# Patient Record
Sex: MALE | Race: WHITE | NOT HISPANIC OR LATINO | URBAN - METROPOLITAN AREA
[De-identification: names, ages, dates, MRNs, and addresses within clinical notes are randomized per-mention and may not be internally consistent; named-entity substitution may affect disease eponyms.]

---

## 2018-05-03 ENCOUNTER — LAB REQUISITION (OUTPATIENT)
Dept: LAB | Facility: HOSPITAL | Age: 83
End: 2018-05-03
Attending: INTERNAL MEDICINE
Payer: COMMERCIAL

## 2018-05-03 DIAGNOSIS — Z85.46 PERSONAL HISTORY OF MALIGNANT NEOPLASM OF PROSTATE: ICD-10-CM

## 2018-05-03 LAB — PSA SERPL-MCNC: 0.47 NG/ML

## 2018-05-03 PROCEDURE — 84153 ASSAY OF PSA TOTAL: CPT | Performed by: INTERNAL MEDICINE

## 2018-07-06 RX ORDER — ROSUVASTATIN CALCIUM 10 MG/1
10 TABLET, COATED ORAL DAILY
COMMUNITY
Start: 2018-01-11 | End: 2018-12-27 | Stop reason: SDUPTHER

## 2018-07-06 RX ORDER — VIT C/E/ZN/COPPR/LUTEIN/ZEAXAN 250MG-90MG
3000 CAPSULE ORAL DAILY
COMMUNITY
Start: 2015-04-03

## 2018-07-06 RX ORDER — ASPIRIN 81 MG/1
81 TABLET ORAL DAILY
COMMUNITY
Start: 2012-06-05

## 2018-07-06 RX ORDER — FISH OIL/DHA/EPA 1200-144MG
1200 CAPSULE ORAL DAILY
COMMUNITY
Start: 2012-01-05 | End: 2022-01-17 | Stop reason: ALTCHOICE

## 2018-07-09 ENCOUNTER — OFFICE VISIT (OUTPATIENT)
Dept: CARDIOLOGY | Facility: CLINIC | Age: 83
End: 2018-07-09
Attending: INTERNAL MEDICINE
Payer: COMMERCIAL

## 2018-07-09 VITALS — WEIGHT: 162 LBS | RESPIRATION RATE: 16 BRPM | HEART RATE: 49 BPM | HEIGHT: 66 IN | BODY MASS INDEX: 26.03 KG/M2

## 2018-07-09 DIAGNOSIS — I25.10 CORONARY ARTERY DISEASE INVOLVING NATIVE CORONARY ARTERY, ANGINA PRESENCE UNSPECIFIED, UNSPECIFIED WHETHER NATIVE OR TRANSPLANTED HEART: Primary | ICD-10-CM

## 2018-07-09 PROBLEM — Z82.49 FAMILY HISTORY OF EARLY CAD: Status: ACTIVE | Noted: 2018-07-09

## 2018-07-09 PROBLEM — E78.5 HYPERLIPIDEMIA: Status: ACTIVE | Noted: 2018-07-09

## 2018-07-09 PROCEDURE — 99213 OFFICE O/P EST LOW 20 MIN: CPT | Performed by: INTERNAL MEDICINE

## 2018-07-09 PROCEDURE — 93000 ELECTROCARDIOGRAM COMPLETE: CPT | Performed by: INTERNAL MEDICINE

## 2018-07-09 ASSESSMENT — ENCOUNTER SYMPTOMS: STIFFNESS: 1

## 2018-07-09 NOTE — LETTER
July 9, 2018     Jose Manuel Espinosa MD  [address]  [place], [address]  [facility] [address]    Patient: Jalen Warren   YOB: 1935   Date of Visit: 7/9/2018       Dear Dr. Espinosa:    Thank you for referring Jalen Warren to me for evaluation. Below are my notes for this consultation.    If you have questions, please do not hesitate to call me. I look forward to following your patient along with you.         Sincerely,        Javi Aquino III, MD        CC: No Recipients  Javi Aquino III, MD  7/9/2018  7:06 PM  Signed     Outpatient Cardiology Office Note          History:  Jalen Warren is a 83 y.o. male who presents for cardiac evaluation.  The patient was last here in January 2018 he continues to play squash regularly he is swimming regularly patient reports no oppressive chest discomfort he reports no syncope or near syncope and has not had to use sublingual nitroglycerin since his last visit.  Patient has been compliant with his medical regimen      Past Medical History:   Diagnosis Date   • Coronary artery disease    • Lipid disorder      No past surgical history on file.  No Known Allergies    Current Outpatient Prescriptions:   •  aspirin 81 mg enteric coated tablet, Take 81 mg by mouth daily., Disp: , Rfl:   •  cholecalciferol, vitamin D3, (VITAMIN D3) 1,000 unit capsule, Take 1,000 Units by mouth daily., Disp: , Rfl:   •  fish oil-dha-epa 1,200-144-216 mg capsule, Take 1,200 mg by mouth daily., Disp: , Rfl:   •  magnesium chloride (SLOW-MAG) 71.5 mg tablet,delayed release (DR/EC), Take 71.5 mg by mouth daily., Disp: , Rfl:   •  rosuvastatin (CRESTOR) 10 mg tablet, Take 10 mg by mouth daily., Disp: , Rfl:   No family history on file.  Social History     Social History   • Marital status:      Spouse name: N/A   • Number of children: N/A   • Years of education: N/A     Social History Main Topics   • Smoking status: Never Smoker   • Smokeless tobacco: Never Used   •  Alcohol use Yes      Comment: social   • Drug use: No   • Sexual activity: Not Asked     Other Topics Concern   • None     Social History Narrative   • None         Review of Systems   Musculoskeletal: Positive for arthritis and stiffness.   All other systems reviewed and are negative.       Vitals:    07/09/18 1054   Pulse: (!) 49   Resp: 16       Physical Exam   Constitutional: He is oriented to person, place, and time. He appears well-developed and well-nourished. No distress.   HENT:   Head: Normocephalic and atraumatic.   Mouth/Throat: No oropharyngeal exudate.   Eyes: Conjunctivae are normal. Pupils are equal, round, and reactive to light. No scleral icterus.   Neck: No JVD present. No tracheal deviation present. No thyromegaly present.   Cardiovascular: Normal rate, regular rhythm, normal heart sounds and intact distal pulses.  Exam reveals no gallop and no friction rub.    No murmur heard.  /78 tilt negative   Pulmonary/Chest: Breath sounds normal. No respiratory distress. He has no rales. He exhibits no tenderness.   Abdominal: He exhibits no distension. There is no tenderness. There is no rebound.   Musculoskeletal: He exhibits no edema, tenderness or deformity.   Lymphadenopathy:     He has no cervical adenopathy.   Neurological: He is alert and oriented to person, place, and time. No cranial nerve deficit.   Skin: Skin is warm and dry.   Psychiatric: He has a normal mood and affect.   Vitals reviewed.      Lab Results   Component Value Date    CHOL 166 12/29/2016    TRIG 100 12/29/2016    HDL 67 12/29/2016    ALT 29 12/29/2016    AST 26 12/29/2016     12/29/2016    K 4.3 12/29/2016    CREATININE 0.7 (L) 12/29/2016      Imaging  Not applicable    ECG   sinus rhythm, No acute ischemic changes.  Compared to the prior tracing there has been no change          Atherosclerosis of coronary artery  Patient is not having any symptoms of angina as noted he had PCI of the posterior lateral branch of the  right coronary artery with placement of his science KISHAN December 2011.    Last noninvasive ischemic assessment in July 2016 showed no provokable ischemia.  Following discussion with the patient we have elected to pursue a conservative course.  We will not proceed with repeat noninvasive testing unless he were to develop symptoms suggestive of angina    Hyperlipidemia  Patient is tolerating rosuvastatin and fish oil which we will continue           Javi Aquino III, MD  7/9/2018

## 2018-07-09 NOTE — ASSESSMENT & PLAN NOTE
Patient is not having any symptoms of angina as noted he had PCI of the posterior lateral branch of the right coronary artery with placement of his science KISHAN December 2011.    Last noninvasive ischemic assessment in July 2016 showed no provokable ischemia.  Following discussion with the patient we have elected to pursue a conservative course.  We will not proceed with repeat noninvasive testing unless he were to develop symptoms suggestive of angina

## 2018-07-09 NOTE — PROGRESS NOTES
Outpatient Cardiology Office Note          History:  Jalen Warren is a 83 y.o. male who presents for cardiac evaluation.  The patient was last here in January 2018 he continues to play squash regularly he is swimming regularly patient reports no oppressive chest discomfort he reports no syncope or near syncope and has not had to use sublingual nitroglycerin since his last visit.  Patient has been compliant with his medical regimen      Past Medical History:   Diagnosis Date   • Coronary artery disease    • Lipid disorder      No past surgical history on file.  No Known Allergies    Current Outpatient Prescriptions:   •  aspirin 81 mg enteric coated tablet, Take 81 mg by mouth daily., Disp: , Rfl:   •  cholecalciferol, vitamin D3, (VITAMIN D3) 1,000 unit capsule, Take 1,000 Units by mouth daily., Disp: , Rfl:   •  fish oil-dha-epa 1,200-144-216 mg capsule, Take 1,200 mg by mouth daily., Disp: , Rfl:   •  magnesium chloride (SLOW-MAG) 71.5 mg tablet,delayed release (DR/EC), Take 71.5 mg by mouth daily., Disp: , Rfl:   •  rosuvastatin (CRESTOR) 10 mg tablet, Take 10 mg by mouth daily., Disp: , Rfl:   No family history on file.  Social History     Social History   • Marital status:      Spouse name: N/A   • Number of children: N/A   • Years of education: N/A     Social History Main Topics   • Smoking status: Never Smoker   • Smokeless tobacco: Never Used   • Alcohol use Yes      Comment: social   • Drug use: No   • Sexual activity: Not Asked     Other Topics Concern   • None     Social History Narrative   • None         Review of Systems   Musculoskeletal: Positive for arthritis and stiffness.   All other systems reviewed and are negative.       Vitals:    07/09/18 1054   Pulse: (!) 49   Resp: 16       Physical Exam   Constitutional: He is oriented to person, place, and time. He appears well-developed and well-nourished. No distress.   HENT:   Head: Normocephalic and atraumatic.   Mouth/Throat: No  oropharyngeal exudate.   Eyes: Conjunctivae are normal. Pupils are equal, round, and reactive to light. No scleral icterus.   Neck: No JVD present. No tracheal deviation present. No thyromegaly present.   Cardiovascular: Normal rate, regular rhythm, normal heart sounds and intact distal pulses.  Exam reveals no gallop and no friction rub.    No murmur heard.  /78 tilt negative   Pulmonary/Chest: Breath sounds normal. No respiratory distress. He has no rales. He exhibits no tenderness.   Abdominal: He exhibits no distension. There is no tenderness. There is no rebound.   Musculoskeletal: He exhibits no edema, tenderness or deformity.   Lymphadenopathy:     He has no cervical adenopathy.   Neurological: He is alert and oriented to person, place, and time. No cranial nerve deficit.   Skin: Skin is warm and dry.   Psychiatric: He has a normal mood and affect.   Vitals reviewed.      Lab Results   Component Value Date    CHOL 166 12/29/2016    TRIG 100 12/29/2016    HDL 67 12/29/2016    ALT 29 12/29/2016    AST 26 12/29/2016     12/29/2016    K 4.3 12/29/2016    CREATININE 0.7 (L) 12/29/2016      Imaging  Not applicable    ECG   sinus rhythm, No acute ischemic changes.  Compared to the prior tracing there has been no change          Atherosclerosis of coronary artery  Patient is not having any symptoms of angina as noted he had PCI of the posterior lateral branch of the right coronary artery with placement of his science KISHAN December 2011.    Last noninvasive ischemic assessment in July 2016 showed no provokable ischemia.  Following discussion with the patient we have elected to pursue a conservative course.  We will not proceed with repeat noninvasive testing unless he were to develop symptoms suggestive of angina    Hyperlipidemia  Patient is tolerating rosuvastatin and fish oil which we will continue           Javi Aquino III, MD  7/9/2018

## 2018-12-27 RX ORDER — ROSUVASTATIN CALCIUM 10 MG/1
10 TABLET, COATED ORAL DAILY
Qty: 90 TABLET | Refills: 3 | Status: SHIPPED | OUTPATIENT
Start: 2018-12-27 | End: 2020-01-22 | Stop reason: SDUPTHER

## 2018-12-31 ENCOUNTER — APPOINTMENT (OUTPATIENT)
Dept: LAB | Facility: HOSPITAL | Age: 83
End: 2018-12-31
Attending: INTERNAL MEDICINE
Payer: COMMERCIAL

## 2018-12-31 DIAGNOSIS — I25.10 CORONARY ARTERY DISEASE INVOLVING NATIVE CORONARY ARTERY, ANGINA PRESENCE UNSPECIFIED, UNSPECIFIED WHETHER NATIVE OR TRANSPLANTED HEART: ICD-10-CM

## 2018-12-31 LAB
ANION GAP SERPL CALC-SCNC: 7 MEQ/L (ref 3–15)
BUN SERPL-MCNC: 10 MG/DL (ref 8–20)
CALCIUM SERPL-MCNC: 9.1 MG/DL (ref 8.9–10.3)
CHLORIDE SERPL-SCNC: 103 MEQ/L (ref 98–109)
CO2 SERPL-SCNC: 28 MEQ/L (ref 22–32)
CREAT SERPL-MCNC: 0.7 MG/DL
GFR SERPL CREATININE-BSD FRML MDRD: >60 ML/MIN/1.73M*2
GLUCOSE SERPL-MCNC: 90 MG/DL (ref 70–99)
POTASSIUM SERPL-SCNC: 4.2 MEQ/L (ref 3.6–5.1)
SODIUM SERPL-SCNC: 138 MEQ/L (ref 136–144)

## 2018-12-31 PROCEDURE — 80061 LIPID PANEL: CPT | Performed by: INTERNAL MEDICINE

## 2018-12-31 PROCEDURE — 80048 BASIC METABOLIC PNL TOTAL CA: CPT

## 2018-12-31 PROCEDURE — 36415 COLL VENOUS BLD VENIPUNCTURE: CPT

## 2019-01-03 ENCOUNTER — OFFICE VISIT (OUTPATIENT)
Dept: CARDIOLOGY | Facility: CLINIC | Age: 84
End: 2019-01-03
Payer: COMMERCIAL

## 2019-01-03 VITALS — BODY MASS INDEX: 25.84 KG/M2 | HEART RATE: 60 BPM | RESPIRATION RATE: 16 BRPM | HEIGHT: 66 IN | WEIGHT: 160.8 LBS

## 2019-01-03 DIAGNOSIS — I25.10 ATHEROSCLEROSIS OF CORONARY ARTERY OF NATIVE HEART WITHOUT ANGINA PECTORIS, UNSPECIFIED VESSEL OR LESION TYPE: ICD-10-CM

## 2019-01-03 DIAGNOSIS — I25.10 CAD IN NATIVE ARTERY: Primary | ICD-10-CM

## 2019-01-03 DIAGNOSIS — E78.5 DYSLIPIDEMIA: ICD-10-CM

## 2019-01-03 DIAGNOSIS — E78.5 HYPERLIPIDEMIA, UNSPECIFIED HYPERLIPIDEMIA TYPE: ICD-10-CM

## 2019-01-03 PROCEDURE — 93000 ELECTROCARDIOGRAM COMPLETE: CPT | Performed by: INTERNAL MEDICINE

## 2019-01-03 PROCEDURE — 99214 OFFICE O/P EST MOD 30 MIN: CPT | Performed by: INTERNAL MEDICINE

## 2019-01-03 NOTE — ASSESSMENT & PLAN NOTE
Profile numbers are satisfactory.  He remains free of myalgias.  He will continue his current statin therapy.

## 2019-01-03 NOTE — ASSESSMENT & PLAN NOTE
Patient has a history of PCI of the posterior lateral branch of the right coronary artery with placement of a drug-eluting stent in December 2011.  He remains free of any anginal symptoms.  Last noninvasive ischemic assessment in July 2016 showed no provokable ischemia.  As per the patient's wishes we will can treat him conservatively.  We will not proceed with any noninvasive testing unless he were to develop symptoms suggestive of angina.

## 2019-01-03 NOTE — PROGRESS NOTES
Outpatient cardiology  Office visit     Reason for visit : Follow up  HPI   Jalen Warren is a 83 y.o. male who presents to the office for cardiovascular follow up.  Continues to place squash on a regular basis without difficulty.  There have been no changes to his medication regimen.  He denies any cardiac symptoms of chest pain at rest with exertion, shortness of breath, orthopnea, PND, lower extremity edema, palpitations, dizziness, presyncopal or syncopal symptoms.           Problem List:  · Hyperlipidemia  · Family history of early CAD  · Atherosclerosis of coronary artery.  In 2011 patient had PCI of the posterior lateral branch of the right coronary artery with placement of a drug-eluting stent.         Medication Sig   • aspirin 81 mg enteric coated tablet Take 81 mg by mouth daily.   • cholecalciferol, vitamin D3, (VITAMIN D3) 1,000 unit capsule Take 1,000 Units by mouth daily.   • fish oil-dha-epa 1,200-144-216 mg capsule Take 1,200 mg by mouth daily.   • magnesium chloride (SLOW-MAG) 71.5 mg tablet,delayed release (DR/EC) Take 71.5 mg by mouth daily.   • rosuvastatin (CRESTOR) 10 mg tablet Take 1 tablet (10 mg total) by mouth daily.       Allergies:  Patient has no known allergies.       Review of Systems  Constitution: Negative for chills, fever, weight gain and weight loss.   HENT: Negative for congestion, hearing loss and nosebleeds.    Eyes: Negative for visual disturbance.   Cardiovascular: Negative for chest pain, claudication, dyspnea on exertion, irregular heartbeat, leg swelling, near-syncope, orthopnea, palpitations, paroxysmal nocturnal dyspnea and syncope.   Respiratory: Negative for cough and shortness of breath.    Hematologic/Lymphatic: Negative for adenopathy. Does not bruise/bleed easily.   Skin: Negative for rash.   Musculoskeletal: Negative for falls, muscle weakness and myalgias.   Gastrointestinal: Negative for abdominal pain, anorexia, constipation, hematemesis, hematochezia,  "melena, nausea and vomiting.   Genitourinary: Negative for dysuria, frequency and nocturia.   Neurological: Negative for dizziness, focal weakness, headaches, light-headedness, numbness and paresthesias.        Objective   Vitals:    01/03/19 1023   Pulse: 60   Resp: 16   Weight: 72.9 kg (160 lb 12.8 oz)   Height: 1.676 m (5' 6\")   Blood pressure 132/68    Physical Exam  Constitutional: Well-developed and well-nourished. No distress.   HENT: Normocephalic and atraumatic. Moist mucous membranes.  Eyes: EOM are normal.   Neck: No JVD present. No carotid bruits.  Cardiovascular: Normal rate and regular rhythm. No murmur, rub or gallop.  Pulmonary/Chest: Normal effort and air entry. No wheezing, rales, ronchi.  Abdominal: Normal bowel sounds. Soft, non tender.   Musculoskeletal: No lower extremity edema or deformity.  Extremities: No edema.   Neurological: Alert and oriented to person, place, and time.   Skin: Skin is warm and dry. No rash.  Psychiatric: Normal mood, affect and behavior.        Labs:   Lab Results   Component Value Date    CHOL 147 12/31/2018    TRIG 61 12/31/2018    HDL 67 12/31/2018    LDLCALC 68 12/31/2018    NONHDLCALC 80 12/31/2018    ALT 29 12/29/2016    AST 26 12/29/2016     12/31/2018    K 4.2 12/31/2018     12/31/2018    CREATININE 0.7 (L) 12/31/2018    BUN 10 12/31/2018    CO2 28 12/31/2018       ECG :  Sinus rhythm             Assessment/Plan   Atherosclerosis of coronary artery  Patient has a history of PCI of the posterior lateral branch of the right coronary artery with placement of a drug-eluting stent in December 2011.  He remains free of any anginal symptoms.  Last noninvasive ischemic assessment in July 2016 showed no provokable ischemia.  As per the patient's wishes we will can treat him conservatively.  We will not proceed with any noninvasive testing unless he were to develop symptoms suggestive of angina.    Hyperlipidemia  Profile numbers are satisfactory.  He remains " free of myalgias.  He will continue his current statin therapy.      Follow up in 6 months.    I personally performed a history and physical and examined the patient with Maribell Vergara, nurse practitioner.  The patient is suffering from an upper respiratory infection today but has no evidence of pneumonitis or bronchitis.  This is most likely viral.  From a cardiac standpoint he is hemodynamically stable.  We will continue vigorous risk factor modification.  As noted previously we will hold any noninvasive testing unless the patient develops symptoms highly suggestive of an acute coronary syndrome as per his wish..

## 2019-01-03 NOTE — LETTER
January 3, 2019     Jose Manuel Espinosa MD  [address]  [place], [address]  [facility] [address]    Patient: Jalen Warren   YOB: 1935   Date of Visit: 1/3/2019       Dear Dr. Espinosa:    Thank you for referring Jalen Warren to me for evaluation. Below are my notes for this consultation.    If you have questions, please do not hesitate to call me. I look forward to following your patient along with you.         Sincerely,        Javi Aquino III, MD        CC: No Recipients  Maribell Vergara CRNP  1/3/2019 11:15 AM  Signed       Outpatient cardiology  Office visit     Reason for visit : Follow up  HPI   Jalen Warren is a 83 y.o. male who presents to the office for cardiovascular follow up.  Continues to place squash on a regular basis without difficulty.  There have been no changes to his medication regimen.  He denies any cardiac symptoms of chest pain at rest with exertion, shortness of breath, orthopnea, PND, lower extremity edema, palpitations, dizziness, presyncopal or syncopal symptoms.           Problem List:  · Hyperlipidemia  · Family history of early CAD  · Atherosclerosis of coronary artery.  In 2011 patient had PCI of the posterior lateral branch of the right coronary artery with placement of a drug-eluting stent.         Medication Sig   • aspirin 81 mg enteric coated tablet Take 81 mg by mouth daily.   • cholecalciferol, vitamin D3, (VITAMIN D3) 1,000 unit capsule Take 1,000 Units by mouth daily.   • fish oil-dha-epa 1,200-144-216 mg capsule Take 1,200 mg by mouth daily.   • magnesium chloride (SLOW-MAG) 71.5 mg tablet,delayed release (DR/EC) Take 71.5 mg by mouth daily.   • rosuvastatin (CRESTOR) 10 mg tablet Take 1 tablet (10 mg total) by mouth daily.       Allergies:  Patient has no known allergies.       Review of Systems  Constitution: Negative for chills, fever, weight gain and weight loss.   HENT: Negative for congestion, hearing loss and nosebleeds.    Eyes:  "Negative for visual disturbance.   Cardiovascular: Negative for chest pain, claudication, dyspnea on exertion, irregular heartbeat, leg swelling, near-syncope, orthopnea, palpitations, paroxysmal nocturnal dyspnea and syncope.   Respiratory: Negative for cough and shortness of breath.    Hematologic/Lymphatic: Negative for adenopathy. Does not bruise/bleed easily.   Skin: Negative for rash.   Musculoskeletal: Negative for falls, muscle weakness and myalgias.   Gastrointestinal: Negative for abdominal pain, anorexia, constipation, hematemesis, hematochezia, melena, nausea and vomiting.   Genitourinary: Negative for dysuria, frequency and nocturia.   Neurological: Negative for dizziness, focal weakness, headaches, light-headedness, numbness and paresthesias.        Objective   Vitals:    01/03/19 1023   Pulse: 60   Resp: 16   Weight: 72.9 kg (160 lb 12.8 oz)   Height: 1.676 m (5' 6\")   Blood pressure 132/68    Physical Exam  Constitutional: Well-developed and well-nourished. No distress.   HENT: Normocephalic and atraumatic. Moist mucous membranes.  Eyes: EOM are normal.   Neck: No JVD present. No carotid bruits.  Cardiovascular: Normal rate and regular rhythm. No murmur, rub or gallop.  Pulmonary/Chest: Normal effort and air entry. No wheezing, rales, ronchi.  Abdominal: Normal bowel sounds. Soft, non tender.   Musculoskeletal: No lower extremity edema or deformity.  Extremities: No edema.   Neurological: Alert and oriented to person, place, and time.   Skin: Skin is warm and dry. No rash.  Psychiatric: Normal mood, affect and behavior.        Labs:   Lab Results   Component Value Date    CHOL 147 12/31/2018    TRIG 61 12/31/2018    HDL 67 12/31/2018    LDLCALC 68 12/31/2018    NONHDLCALC 80 12/31/2018    ALT 29 12/29/2016    AST 26 12/29/2016     12/31/2018    K 4.2 12/31/2018     12/31/2018    CREATININE 0.7 (L) 12/31/2018    BUN 10 12/31/2018    CO2 28 12/31/2018       ECG :  Sinus rhythm         "     Assessment/Plan   Atherosclerosis of coronary artery  Patient has a history of PCI of the posterior lateral branch of the right coronary artery with placement of a drug-eluting stent in December 2011.  He remains free of any anginal symptoms.  Last noninvasive ischemic assessment in July 2016 showed no provokable ischemia.  As per the patient's wishes we will can treat him conservatively.  We will not proceed with any noninvasive testing unless he were to develop symptoms suggestive of angina.    Hyperlipidemia  Profile numbers are satisfactory.  He remains free of myalgias.  He will continue his current statin therapy.      Follow up in 6 months.    I personally performed a history and physical and examined the patient with Maribell Vergara, nurse practitioner.  The patient is suffering from an upper respiratory infection today but has no evidence of pneumonitis or bronchitis.  This is most likely viral.  From a cardiac standpoint he is hemodynamically stable.  We will continue vigorous risk factor modification.  As noted previously we will hold any noninvasive testing unless the patient develops symptoms highly suggestive of an acute coronary syndrome as per his wish..

## 2019-07-05 ENCOUNTER — OFFICE VISIT (OUTPATIENT)
Dept: CARDIOLOGY | Facility: CLINIC | Age: 84
End: 2019-07-05
Payer: COMMERCIAL

## 2019-07-05 VITALS — BODY MASS INDEX: 26.36 KG/M2 | WEIGHT: 164 LBS | RESPIRATION RATE: 16 BRPM | HEART RATE: 63 BPM | HEIGHT: 66 IN

## 2019-07-05 DIAGNOSIS — Z82.49 FAMILY HISTORY OF EARLY CAD: ICD-10-CM

## 2019-07-05 DIAGNOSIS — R00.2 PALPITATIONS: Primary | ICD-10-CM

## 2019-07-05 DIAGNOSIS — E78.5 HYPERLIPIDEMIA, UNSPECIFIED HYPERLIPIDEMIA TYPE: ICD-10-CM

## 2019-07-05 DIAGNOSIS — I25.10 ATHEROSCLEROSIS OF CORONARY ARTERY OF NATIVE HEART WITHOUT ANGINA PECTORIS, UNSPECIFIED VESSEL OR LESION TYPE: ICD-10-CM

## 2019-07-05 PROBLEM — R42 LIGHTHEADED: Status: ACTIVE | Noted: 2019-07-05

## 2019-07-05 PROCEDURE — 93000 ELECTROCARDIOGRAM COMPLETE: CPT | Performed by: INTERNAL MEDICINE

## 2019-07-05 PROCEDURE — 99214 OFFICE O/P EST MOD 30 MIN: CPT | Performed by: INTERNAL MEDICINE

## 2019-07-05 RX ORDER — OMEGA-3-ACID ETHYL ESTERS 1 G/1
1000 CAPSULE, LIQUID FILLED ORAL DAILY
COMMUNITY
End: 2022-01-17 | Stop reason: ALTCHOICE

## 2019-07-05 RX ORDER — ROSUVASTATIN CALCIUM 5 MG/1
5 TABLET, COATED ORAL DAILY
COMMUNITY
End: 2020-01-13

## 2019-07-05 RX ORDER — CHOLECALCIFEROL (VITAMIN D3) 25 MCG
1000 TABLET ORAL DAILY
COMMUNITY
End: 2022-01-17 | Stop reason: ALTCHOICE

## 2019-07-05 ASSESSMENT — ENCOUNTER SYMPTOMS
LIGHT-HEADEDNESS: 1
STIFFNESS: 1
DECREASED APPETITE: 0
WEIGHT GAIN: 1

## 2019-07-05 NOTE — LETTER
July 5, 2019     Jose Manuel Espinosa MD  [address]  [place]  [facility] [address]    Patient: Jalen Warren  YOB: 1935  Date of Visit: 7/5/2019      Dear Dr. Espinosa:    Thank you for referring Jalen Warren to me for evaluation. Below are my notes for this consultation.    If you have questions, please do not hesitate to call me. I look forward to following your patient along with you.         Sincerely,        Javi Aquino III, MD        CC: No Recipients  Javi Aquino III, MD  7/5/2019  9:49 AM  Signed     Outpatient Cardiology Office Note          History:  Jalen Warren is a 83 y.o. male who presents for cardiac evaluation.  The patient was last here in January.  Since that time he reports no oppressive chest discomfort.  He has had no significant exertional dyspnea.  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  The patient has also had some lightheadedness abrupt change in posture.  He now says he has to hold onto something when he is putting his pants on.  He has had no falls.  He has had no syncope.        Past Medical History:   Diagnosis Date   • Coronary artery disease    • Lipid disorder      History reviewed. No pertinent surgical history.  No Known Allergies    Current Outpatient Prescriptions:   •  aspirin 81 mg enteric coated tablet, Take 81 mg by mouth daily., Disp: , Rfl:   •  cholecalciferol, vitamin D3, (VITAMIN D3) 1,000 unit capsule, Take 1,000 Units by mouth daily., Disp: , Rfl:   •  fish oil-dha-epa 1,200-144-216 mg capsule, Take 1,200 mg by mouth daily., Disp: , Rfl:   •  magnesium chloride (SLOW-MAG) 71.5 mg tablet,delayed release (DR/EC), Take 71.5 mg by mouth daily., Disp: , Rfl:   •  omega-3 acid ethyl esters (LOVAZA) 1 gram capsule, Take 1,000 mg by mouth daily., Disp: , Rfl:   •  rosuvastatin  (CRESTOR) 10 mg tablet, Take 1 tablet (10 mg total) by mouth daily., Disp: 90 tablet, Rfl: 3  •  cholecalciferol, vitamin D3, 1,000 unit (25 mcg) tablet, Take 1,000 Units by mouth daily., Disp: , Rfl:   •  rosuvastatin (CRESTOR) 5 mg tablet, Take 5 mg by mouth once daily., Disp: , Rfl:   History reviewed. No pertinent family history.  Social History     Social History   • Marital status:      Spouse name: N/A   • Number of children: N/A   • Years of education: N/A     Social History Main Topics   • Smoking status: Never Smoker   • Smokeless tobacco: Never Used   • Alcohol use Yes      Comment: social   • Drug use: No   • Sexual activity: Not Asked     Other Topics Concern   • None     Social History Narrative   • None         Review of Systems   Constitution: Positive for weight gain. Negative for decreased appetite.   Musculoskeletal: Positive for stiffness.   Neurological: Positive for light-headedness.   All other systems reviewed and are negative.       Vitals:    07/05/19 0922   Pulse: 63   Resp: 16       Weight: 74.4 kg (164 lb)     Physical Exam   Constitutional: He is oriented to person, place, and time.   Well-developed well-nourished male in no acute distress  Weight 164 pounds up 4 pounds  Blood pressure 138/78 supine sitting and standing with no change in pulse.  He is not orthostatic   HENT:   Mouth/Throat: No oropharyngeal exudate.   Neck: No thyromegaly present.   Cardiovascular: Normal rate, regular rhythm, normal heart sounds and intact distal pulses.  Exam reveals no gallop and no friction rub.    No murmur heard.  Pulmonary/Chest: Breath sounds normal.   Abdominal: Bowel sounds are normal. He exhibits no distension.   Musculoskeletal: He exhibits no edema, tenderness or deformity.   Neurological: He is alert and oriented to person, place, and time. No cranial nerve deficit.   Skin: Skin is warm and dry. No erythema.   Psychiatric: He has a normal mood and affect. His behavior is normal.    Vitals reviewed.      Lab Results   Component Value Date    CHOL 147 12/31/2018    TRIG 61 12/31/2018    HDL 67 12/31/2018    ALT 29 12/29/2016    AST 26 12/29/2016     12/31/2018    K 4.2 12/31/2018    CREATININE 0.7 (L) 12/31/2018      Imaging  Not applicable    ECG   sinus rhythm, No acute ischemic changes    ECHO      1.  Transient lightheadedness.  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  I have encouraged him to remain well-hydrated particularly when exercising.  I really do not think there is much else to do with this point    2.  Coronary artery disease.  Status post PCI of the posterior lateral branch of the RCA with placement of a drug-eluting stent in December 2011.  His last noninvasive ischemic assessment in July 2016 showed no provokable ischemia we will continue conservative therapy he is aware for the need of immediate attention should he develop any symptoms consistent with unstable angina    3.  Dyslipidemia.  Most recent LDL cholesterol performed in May was 58 mg/dL which is absolutely optimal.  He will continue the same statin regimen         Javi Aquino III, MD  7/5/2019

## 2020-01-04 LAB
CHOLEST SERPL-MCNC: 170 MG/DL (ref 100–199)
HDLC SERPL-MCNC: 72 MG/DL
LDLC SERPL CALC-MCNC: 80 MG/DL (ref 0–99)
TRIGL SERPL-MCNC: 92 MG/DL (ref 0–149)
VLDLC SERPL CALC-MCNC: 18 MG/DL (ref 5–40)

## 2020-01-09 ENCOUNTER — TELEPHONE (OUTPATIENT)
Dept: SCHEDULING | Facility: CLINIC | Age: 85
End: 2020-01-09

## 2020-01-09 NOTE — TELEPHONE ENCOUNTER
FYI- patient called to cancel appt for today 1/9 due to illness, r/s for 1/13    Pt can be reached at [phone]

## 2020-01-13 ENCOUNTER — OFFICE VISIT (OUTPATIENT)
Dept: CARDIOLOGY | Facility: CLINIC | Age: 85
End: 2020-01-13
Payer: COMMERCIAL

## 2020-01-13 VITALS — HEART RATE: 66 BPM | RESPIRATION RATE: 16 BRPM | BODY MASS INDEX: 25.55 KG/M2 | WEIGHT: 159 LBS | HEIGHT: 66 IN

## 2020-01-13 DIAGNOSIS — I25.10 CORONARY ARTERY DISEASE, ANGINA PRESENCE UNSPECIFIED, UNSPECIFIED VESSEL OR LESION TYPE, UNSPECIFIED WHETHER NATIVE OR TRANSPLANTED HEART: Primary | ICD-10-CM

## 2020-01-13 DIAGNOSIS — E78.5 HYPERLIPIDEMIA, UNSPECIFIED HYPERLIPIDEMIA TYPE: ICD-10-CM

## 2020-01-13 DIAGNOSIS — I25.10 ATHEROSCLEROSIS OF CORONARY ARTERY OF NATIVE HEART WITHOUT ANGINA PECTORIS, UNSPECIFIED VESSEL OR LESION TYPE: ICD-10-CM

## 2020-01-13 PROCEDURE — 93000 ELECTROCARDIOGRAM COMPLETE: CPT | Performed by: INTERNAL MEDICINE

## 2020-01-13 PROCEDURE — 99214 OFFICE O/P EST MOD 30 MIN: CPT | Performed by: INTERNAL MEDICINE

## 2020-01-13 ASSESSMENT — ENCOUNTER SYMPTOMS
STIFFNESS: 1
WEIGHT LOSS: 0
WEIGHT GAIN: 0

## 2020-01-13 NOTE — PROGRESS NOTES
Javi Aquino III, MD, Merged with [facility], [facility]  Interventional Cardiology      [facility]  [facility]  [facility] [address] [facility]  [address] [place] [address]    TEL  [phone]  Fax:  [phone]  [facility] [place]            Outpatient Cardiology Office Note          History:  Jalen Warren is a 84 y.o. male who presents for cardiac evaluation.  Patient was last here in July.  He reports no oppressive chest discomfort or exertional dyspnea.  He is able to play squash 3 days a week.  He has had some continued lightheadedness with abrupt change in posture.  He has not fallen while playing squash.  He reports no dyspnea syncope or near syncope      Past Medical History:   Diagnosis Date   • Coronary artery disease    • Lipid disorder      History reviewed. No pertinent surgical history.  No Known Allergies    Current Outpatient Medications:   •  aspirin 81 mg enteric coated tablet, Take 81 mg by mouth daily., Disp: , Rfl:   •  cholecalciferol, vitamin D3, (VITAMIN D3) 1,000 unit capsule, Take 1,000 Units by mouth daily., Disp: , Rfl:   •  fish oil-dha-epa 1,200-144-216 mg capsule, Take 1,200 mg by mouth daily., Disp: , Rfl:   •  magnesium chloride (SLOW-MAG) 71.5 mg tablet,delayed release (DR/EC), Take 71.5 mg by mouth daily., Disp: , Rfl:   •  omega-3 acid ethyl esters (LOVAZA) 1 gram capsule, Take 1,000 mg by mouth daily., Disp: , Rfl:   •  rosuvastatin (CRESTOR) 10 mg tablet, Take 1 tablet (10 mg total) by mouth daily., Disp: 90 tablet, Rfl: 3  •  cholecalciferol, vitamin D3, 1,000 unit (25 mcg) tablet, Take 1,000 Units by mouth daily., Disp: , Rfl:   History reviewed. No pertinent family history.  Social History     Socioeconomic History   • Marital status:      Spouse name: None   • Number of children: None   • Years of education: None   • Highest education level: None   Occupational History   • None   Social Needs   • Financial resource strain:  None   • Food insecurity:     Worry: None     Inability: None   • Transportation needs:     Medical: None     Non-medical: None   Tobacco Use   • Smoking status: Never Smoker   • Smokeless tobacco: Never Used   Substance and Sexual Activity   • Alcohol use: Yes     Comment: social   • Drug use: No   • Sexual activity: None   Lifestyle   • Physical activity:     Days per week: None     Minutes per session: None   • Stress: None   Relationships   • Social connections:     Talks on phone: None     Gets together: None     Attends Worship service: None     Active member of club or organization: None     Attends meetings of clubs or organizations: None     Relationship status: None   • Intimate partner violence:     Fear of current or ex partner: None     Emotionally abused: None     Physically abused: None     Forced sexual activity: None   Other Topics Concern   • None   Social History Narrative   • None         Review of Systems   Constitution: Negative for weight gain and weight loss.   Musculoskeletal: Positive for arthritis and stiffness.   All other systems reviewed and are negative.       Vitals:    01/13/20 1036   Pulse: 66   Resp: 16       Weight: 72.1 kg (159 lb)     Physical Exam   Constitutional:   Well-developed well-nourished male in no acute distress  Weight 159 pounds  Blood pressure 130/68  Skin, HEENT: Normocephalic atraumatic  Neck: No bruits  Lungs clear  Cardiac regular rate.  I hear no murmurs  Abdomen soft  Extremities unremarkable no palpable cords.  Neuro: No focal deficits.       Vitals reviewed.      Lab Results   Component Value Date    CHOL 170 01/03/2020    TRIG 92 01/03/2020    HDL 72 01/03/2020    ALT 29 12/29/2016    AST 26 12/29/2016     12/31/2018    K 4.2 12/31/2018    CREATININE 0.7 (L) 12/31/2018      Imaging  Not applicable    ECG   sinus rhythm, occasional premature atrial beats, No acute ischemic change    ECHO      1.  Coronary artery disease.  Status post PCI of the  posterior lateral branch of the RCA with placement of a drug-eluting stent in December 2011.  Patient's last noninvasive ischemic assessment in July 2016 showed no provokable ischemia.  Patient's not really interested in proceeding with repeat noninvasive testing unless he were to develop symptoms.  The patient is aware of the need for immediate attention if there is any change in his clinical status.  He will continue the same regimen.    2.  Dyslipidemia.  Numbers of been satisfactory.  He will continue the same statin regimen.  He is having no myalgias or myositis.    3.  Patient reports that his son will be moving back to the [facility] from [place].  Of note, his 10-year-old granddaughter recently represented the [place] against [place] in squash         Javi Aquino III, MD  1/13/2020

## 2020-01-13 NOTE — LETTER
January 13, 2020     Jose Manuel Espinosa MD  [address] [place] [address] Ave  [place], [address] [place], [place] [id number]    Patient: Jalen Warren  YOB: 1935  Date of Visit: 1/13/2020      Dear Dr. Espinosa:    Thank you for referring Jalen Warren to me for evaluation. Below are my notes for this consultation.    If you have questions, please do not hesitate to call me. I look forward to following your patient along with you.         Sincerely,        Javi Aquino III, MD        CC: No Recipients  Javi Aquino III, MD  1/13/2020 11:17 AM  Signed        Javi Aquino III, MD, [facility], [facility]  Interventional Cardiology      [facility]  [facility]  [facility] Level  [address] [place] [address]    TEL  [phone]  Fax:  [phone]  [facility]            Outpatient Cardiology Office Note          History:  Jalen Warren is a 84 y.o. male who presents for cardiac evaluation.  Patient was last here in July.  He reports no oppressive chest discomfort or exertional dyspnea.  He is able to play squash 3 days a week.  He has had some continued lightheadedness with abrupt change in posture.  He has not fallen while playing squash.  He reports no dyspnea syncope or near syncope      Past Medical History:   Diagnosis Date   • Coronary artery disease    • Lipid disorder      History reviewed. No pertinent surgical history.  No Known Allergies    Current Outpatient Medications:   •  aspirin 81 mg enteric coated tablet, Take 81 mg by mouth daily., Disp: , Rfl:   •  cholecalciferol, vitamin D3, (VITAMIN D3) 1,000 unit capsule, Take 1,000 Units by mouth daily., Disp: , Rfl:   •  fish oil-dha-epa 1,200-144-216 mg capsule, Take 1,200 mg by mouth daily., Disp: , Rfl:   •  magnesium chloride (SLOW-MAG) 71.5 mg tablet,delayed release (DR/EC), Take 71.5 mg by mouth daily., Disp: , Rfl:   •  omega-3 acid ethyl esters (LOVAZA) 1 gram capsule, Take 1,000 mg  by mouth daily., Disp: , Rfl:   •  rosuvastatin (CRESTOR) 10 mg tablet, Take 1 tablet (10 mg total) by mouth daily., Disp: 90 tablet, Rfl: 3  •  cholecalciferol, vitamin D3, 1,000 unit (25 mcg) tablet, Take 1,000 Units by mouth daily., Disp: , Rfl:   History reviewed. No pertinent family history.  Social History     Socioeconomic History   • Marital status:      Spouse name: None   • Number of children: None   • Years of education: None   • Highest education level: None   Occupational History   • None   Social Needs   • Financial resource strain: None   • Food insecurity:     Worry: None     Inability: None   • Transportation needs:     Medical: None     Non-medical: None   Tobacco Use   • Smoking status: Never Smoker   • Smokeless tobacco: Never Used   Substance and Sexual Activity   • Alcohol use: Yes     Comment: social   • Drug use: No   • Sexual activity: None   Lifestyle   • Physical activity:     Days per week: None     Minutes per session: None   • Stress: None   Relationships   • Social connections:     Talks on phone: None     Gets together: None     Attends [employer] service: None     Active member of club or organization: None     Attends meetings of clubs or organizations: None     Relationship status: None   • Intimate partner violence:     Fear of current or ex partner: None     Emotionally abused: None     Physically abused: None     Forced sexual activity: None   Other Topics Concern   • None   Social History Narrative   • None         Review of Systems   Constitution: Negative for weight gain and weight loss.   Musculoskeletal: Positive for arthritis and stiffness.   All other systems reviewed and are negative.       Vitals:    01/13/20 1036   Pulse: 66   Resp: 16       Weight: 72.1 kg (159 lb)     Physical Exam   Constitutional:   Well-developed well-nourished male in no acute distress  Weight 159 pounds  Blood pressure 130/68  Skin, HEENT: Normocephalic atraumatic  Neck: No bruits  Lungs  clear  Cardiac regular rate.  I hear no murmurs  Abdomen soft  Extremities unremarkable no palpable cords.  Neuro: No focal deficits.       Vitals reviewed.      Lab Results   Component Value Date    CHOL 170 01/03/2020    TRIG 92 01/03/2020    HDL 72 01/03/2020    ALT 29 12/29/2016    AST 26 12/29/2016     12/31/2018    K 4.2 12/31/2018    CREATININE 0.7 (L) 12/31/2018      Imaging  Not applicable    ECG   sinus rhythm, occasional premature atrial beats, No acute ischemic change    ECHO      1.  Coronary artery disease.  Status post PCI of the posterior lateral branch of the RCA with placement of a drug-eluting stent in December 2011.  Patient's last noninvasive ischemic assessment in July 2016 showed no provokable ischemia.  Patient's not really interested in proceeding with repeat noninvasive testing unless he were to develop symptoms.  The patient is aware of the need for immediate attention if there is any change in his clinical status.  He will continue the same regimen.    2.  Dyslipidemia.  Numbers of been satisfactory.  He will continue the same statin regimen.  He is having no myalgias or myositis.    3.  Patient reports that his son will be moving back to the [facility] from [place].  Of note, his 10-year-old granddaughter recently represented the [place] against [place] in squash         Javi Aquino III, MD  1/13/2020

## 2020-01-22 RX ORDER — ROSUVASTATIN CALCIUM 10 MG/1
10 TABLET, COATED ORAL DAILY
Qty: 90 TABLET | Refills: 3 | Status: SHIPPED | OUTPATIENT
Start: 2020-01-22 | End: 2021-02-08

## 2020-07-16 ENCOUNTER — TELEPHONE (OUTPATIENT)
Dept: CARDIOLOGY | Facility: CLINIC | Age: 85
End: 2020-07-16

## 2020-07-16 NOTE — TELEPHONE ENCOUNTER
Message left for Jalen Warren to confirm 7.20.2020 in office visit. Asked Jalen Warren to please call office to confirm appointment, verify demographic information, review office restrictions and to complete travel screening. Please update appointment note upon return call.

## 2020-07-20 ENCOUNTER — OFFICE VISIT (OUTPATIENT)
Dept: CARDIOLOGY | Facility: CLINIC | Age: 85
End: 2020-07-20
Payer: COMMERCIAL

## 2020-07-20 VITALS
SYSTOLIC BLOOD PRESSURE: 130 MMHG | BODY MASS INDEX: 25.54 KG/M2 | RESPIRATION RATE: 17 BRPM | WEIGHT: 162.7 LBS | HEIGHT: 67 IN | HEART RATE: 61 BPM | DIASTOLIC BLOOD PRESSURE: 90 MMHG

## 2020-07-20 DIAGNOSIS — E78.5 HYPERLIPIDEMIA, UNSPECIFIED HYPERLIPIDEMIA TYPE: ICD-10-CM

## 2020-07-20 DIAGNOSIS — Z82.49 FAMILY HISTORY OF EARLY CAD: ICD-10-CM

## 2020-07-20 DIAGNOSIS — R42 LIGHTHEADED: ICD-10-CM

## 2020-07-20 DIAGNOSIS — I25.10 CORONARY ARTERY DISEASE, ANGINA PRESENCE UNSPECIFIED, UNSPECIFIED VESSEL OR LESION TYPE, UNSPECIFIED WHETHER NATIVE OR TRANSPLANTED HEART: Primary | ICD-10-CM

## 2020-07-20 DIAGNOSIS — I25.10 ATHEROSCLEROSIS OF CORONARY ARTERY OF NATIVE HEART WITHOUT ANGINA PECTORIS, UNSPECIFIED VESSEL OR LESION TYPE: ICD-10-CM

## 2020-07-20 PROCEDURE — 99214 OFFICE O/P EST MOD 30 MIN: CPT | Performed by: INTERNAL MEDICINE

## 2020-07-20 PROCEDURE — 93000 ELECTROCARDIOGRAM COMPLETE: CPT | Performed by: INTERNAL MEDICINE

## 2020-07-20 ASSESSMENT — ENCOUNTER SYMPTOMS
WEIGHT LOSS: 0
WEIGHT GAIN: 0
LIGHT-HEADEDNESS: 1

## 2020-07-20 NOTE — LETTER
July 20, 2020     Jose Manuel Espinosa MD  [address]  [place] [place], [place] [id number]    Patient: Jalen Warren  YOB: 1935  Date of Visit: 7/20/2020      Dear Dr. Espinosa:    Thank you for referring Jalen Warren to me for evaluation. Below are my notes for this consultation.    If you have questions, please do not hesitate to call me. I look forward to following your patient along with you.         Sincerely,        Javi Aquino III, MD        CC: No Recipients  Javi Aquino III, MD  7/20/2020 11:06 AM  Signed              Javi Aquino III, MD, [facility], [facility]  Interventional Cardiology      [facility]  [facility]  [facility] Level  [address] [place] [address]    TEL  [phone]  Fax:  [phone]  [facility]            Outpatient Cardiology Office Note          History:  Jalen Warren is a 85 y.o. male who presents for iliac evaluation.  The patient was last here in January.  He reports no oppressive chest discomfort.  He has had no syncope or near syncope.  He does have transient dizziness particularly after having an alcoholic beverage.  He has been playing tennis on grass in the mornings even when it has been particularly hot.  He was advised to avoid this.      Past Medical History:   Diagnosis Date   • Coronary artery disease    • Lipid disorder      History reviewed. No pertinent surgical history.  No Known Allergies    Current Outpatient Medications:   •  aspirin 81 mg enteric coated tablet, Take 81 mg by mouth daily., Disp: , Rfl:   •  cholecalciferol, vitamin D3, (VITAMIN D3) 1,000 unit capsule, Take 1,000 Units by mouth daily., Disp: , Rfl:   •  fish oil-dha-epa 1,200-144-216 mg capsule, Take 1,200 mg by mouth daily., Disp: , Rfl:   •  magnesium chloride (SLOW-MAG) 71.5 mg tablet,delayed release (DR/EC), Take 71.5 mg by mouth daily., Disp: , Rfl:   •  rosuvastatin (CRESTOR) 10 mg tablet,  Take 1 tablet (10 mg total) by mouth daily., Disp: 90 tablet, Rfl: 3  •  cholecalciferol, vitamin D3, 1,000 unit (25 mcg) tablet, Take 1,000 Units by mouth daily., Disp: , Rfl:   •  omega-3 acid ethyl esters (LOVAZA) 1 gram capsule, Take 1,000 mg by mouth daily., Disp: , Rfl:   Family History   Problem Relation Age of Onset   • Cancer Biological Mother    • No Known Problems Biological Father    • Heart disease Biological Brother      Social History     Socioeconomic History   • Marital status:      Spouse name: None   • Number of children: None   • Years of education: None   • Highest education level: None   Occupational History   • None   Social Needs   • Financial resource strain: None   • Food insecurity:     Worry: None     Inability: None   • Transportation needs:     Medical: None     Non-medical: None   Tobacco Use   • Smoking status: Never Smoker   • Smokeless tobacco: Never Used   Substance and Sexual Activity   • Alcohol use: Yes     Comment: social   • Drug use: No   • Sexual activity: None   Lifestyle   • Physical activity:     Days per week: None     Minutes per session: None   • Stress: None   Relationships   • Social connections:     Talks on phone: None     Gets together: None     Attends Orthodoxy service: None     Active member of club or organization: None     Attends meetings of clubs or organizations: None     Relationship status: None   • Intimate partner violence:     Fear of current or ex partner: None     Emotionally abused: None     Physically abused: None     Forced sexual activity: None   Other Topics Concern   • None   Social History Narrative   • None         Review of Systems   Constitution: Negative for weight gain and weight loss.   Neurological: Positive for light-headedness.   All other systems reviewed and are negative.       Vitals:    07/20/20 0943   BP: 130/90   Pulse: 61   Resp: 17       Weight: 73.8 kg (162 lb 11.2 oz)     Physical Exam   Constitutional:    Well-developed well-nourished male in no acute distress  Weight 162.7 pounds up almost 4 pounds  Blood pressure 140/78 supine sitting and standing with no change in pulse.  He does not have orthostatic hypotension  Skin: Warm dry  HEENT: Normocephalic atraumatic  Neck: No JVD no bruits no adenopathy no thyromegaly  Lungs clear  Cardiac regular rate tones are of good quality no S3 or S4  Abdomen soft bowel sounds present no organomegaly  Extremities distal pulses are full no clubbing no cyanosis no edema  Neuro no focal motor or sensory deficits no nystagmus.     Vitals reviewed.      Lab Results   Component Value Date    CHOL 170 01/03/2020    TRIG 92 01/03/2020    HDL 72 01/03/2020    ALT 29 12/29/2016    AST 26 12/29/2016     12/31/2018    K 4.2 12/31/2018    CREATININE 0.7 (L) 12/31/2018      Imaging  Not applicable    ECG   sinus rhythm, No acute ischemic changes    ECHO      1.  Coronary artery disease.  Prior PCI of the posterior lateral branch of the RCA with placement of a drug-eluting stent December 2011.  It is coming up on 9 years since his intervention last noninvasive assessment in July 2016 showed no provokable ischemia.  Patient's not interested in proceeding with further evaluation unless he were to develop recurrent symptoms.  Patient is aware of the need for immediate attention if there is any change in his clinical status.    2.  Dyslipidemia.  Patient's numbers have been satisfactory he will continue the same statin regimen.    3.  Transient lightheadedness.  This is certainly not orthostatic hypotension.  At this point I have encouraged the patient to stay well-hydrated.  He is not ever had symptoms while playing tennis.  We will continue to watch him.  He is aware of the need for prompt attention if the symptoms worsen         Javi Aquino III, MD  7/20/2020

## 2020-07-20 NOTE — PROGRESS NOTES
Javi Aquino III, MD, [facility], [facility]  Interventional Cardiology      [facility]  [facility]  [facility] [address]  [address] [place] [address]    TEL  [phone]  Fax:  [phone]  [facility]            Outpatient Cardiology Office Note          History:  Jalen Warren is a 85 y.o. male who presents for iliac evaluation.  The patient was last here in January.  He reports no oppressive chest discomfort.  He has had no syncope or near syncope.  He does have transient dizziness particularly after having an alcoholic beverage.  He has been playing tennis on grass in the mornings even when it has been particularly hot.  He was advised to avoid this.      Past Medical History:   Diagnosis Date   • Coronary artery disease    • Lipid disorder      History reviewed. No pertinent surgical history.  No Known Allergies    Current Outpatient Medications:   •  aspirin 81 mg enteric coated tablet, Take 81 mg by mouth daily., Disp: , Rfl:   •  cholecalciferol, vitamin D3, (VITAMIN D3) 1,000 unit capsule, Take 1,000 Units by mouth daily., Disp: , Rfl:   •  fish oil-dha-epa 1,200-144-216 mg capsule, Take 1,200 mg by mouth daily., Disp: , Rfl:   •  magnesium chloride (SLOW-MAG) 71.5 mg tablet,delayed release (DR/EC), Take 71.5 mg by mouth daily., Disp: , Rfl:   •  rosuvastatin (CRESTOR) 10 mg tablet, Take 1 tablet (10 mg total) by mouth daily., Disp: 90 tablet, Rfl: 3  •  cholecalciferol, vitamin D3, 1,000 unit (25 mcg) tablet, Take 1,000 Units by mouth daily., Disp: , Rfl:   •  omega-3 acid ethyl esters (LOVAZA) 1 gram capsule, Take 1,000 mg by mouth daily., Disp: , Rfl:   Family History   Problem Relation Age of Onset   • Cancer Biological Mother    • No Known Problems Biological Father    • Heart disease Biological Brother      Social History     Socioeconomic History   • Marital status:      Spouse name: None   • Number of children:  None   • Years of education: None   • Highest education level: None   Occupational History   • None   Social Needs   • Financial resource strain: None   • Food insecurity:     Worry: None     Inability: None   • Transportation needs:     Medical: None     Non-medical: None   Tobacco Use   • Smoking status: Never Smoker   • Smokeless tobacco: Never Used   Substance and Sexual Activity   • Alcohol use: Yes     Comment: social   • Drug use: No   • Sexual activity: None   Lifestyle   • Physical activity:     Days per week: None     Minutes per session: None   • Stress: None   Relationships   • Social connections:     Talks on phone: None     Gets together: None     Attends [employer] service: None     Active member of club or organization: None     Attends meetings of clubs or organizations: None     Relationship status: None   • Intimate partner violence:     Fear of current or ex partner: None     Emotionally abused: None     Physically abused: None     Forced sexual activity: None   Other Topics Concern   • None   Social History Narrative   • None         Review of Systems   Constitution: Negative for weight gain and weight loss.   Neurological: Positive for light-headedness.   All other systems reviewed and are negative.       Vitals:    07/20/20 0943   BP: 130/90   Pulse: 61   Resp: 17       Weight: 73.8 kg (162 lb 11.2 oz)     Physical Exam   Constitutional:   Well-developed well-nourished male in no acute distress  Weight 162.7 pounds up almost 4 pounds  Blood pressure 140/78 supine sitting and standing with no change in pulse.  He does not have orthostatic hypotension  Skin: Warm dry  HEENT: Normocephalic atraumatic  Neck: No JVD no bruits no adenopathy no thyromegaly  Lungs clear  Cardiac regular rate tones are of good quality no S3 or S4  Abdomen soft bowel sounds present no organomegaly  Extremities distal pulses are full no clubbing no cyanosis no edema  Neuro no focal motor or sensory deficits no  nystagmus.     Vitals reviewed.      Lab Results   Component Value Date    CHOL 170 01/03/2020    TRIG 92 01/03/2020    HDL 72 01/03/2020    ALT 29 12/29/2016    AST 26 12/29/2016     12/31/2018    K 4.2 12/31/2018    CREATININE 0.7 (L) 12/31/2018      Imaging  Not applicable    ECG   sinus rhythm, No acute ischemic changes    ECHO      1.  Coronary artery disease.  Prior PCI of the posterior lateral branch of the RCA with placement of a drug-eluting stent December 2011.  It is coming up on 9 years since his intervention last noninvasive assessment in July 2016 showed no provokable ischemia.  Patient's not interested in proceeding with further evaluation unless he were to develop recurrent symptoms.  Patient is aware of the need for immediate attention if there is any change in his clinical status.    2.  Dyslipidemia.  Patient's numbers have been satisfactory he will continue the same statin regimen.    3.  Transient lightheadedness.  This is certainly not orthostatic hypotension.  At this point I have encouraged the patient to stay well-hydrated.  He is not ever had symptoms while playing tennis.  We will continue to watch him.  He is aware of the need for prompt attention if the symptoms worsen         Javi Aquino III, MD  7/20/2020

## 2021-01-22 ENCOUNTER — TELEPHONE (OUTPATIENT)
Dept: CARDIOLOGY | Facility: CLINIC | Age: 86
End: 2021-01-22

## 2021-01-22 RX ORDER — EFINACONAZOLE 100 MG/ML
1 SOLUTION TOPICAL DAILY
COMMUNITY
Start: 2021-01-06 | End: 2022-07-26

## 2021-01-24 DIAGNOSIS — Z23 ENCOUNTER FOR IMMUNIZATION: ICD-10-CM

## 2021-01-25 ENCOUNTER — TELEPHONE (OUTPATIENT)
Dept: SCHEDULING | Facility: CLINIC | Age: 86
End: 2021-01-25

## 2021-01-25 ENCOUNTER — OFFICE VISIT (OUTPATIENT)
Dept: CARDIOLOGY | Facility: CLINIC | Age: 86
End: 2021-01-25
Payer: COMMERCIAL

## 2021-01-25 VITALS — BODY MASS INDEX: 26.06 KG/M2 | HEIGHT: 67 IN | OXYGEN SATURATION: 98 % | HEART RATE: 63 BPM | WEIGHT: 166 LBS

## 2021-01-25 DIAGNOSIS — I25.10 CORONARY ARTERY DISEASE, ANGINA PRESENCE UNSPECIFIED, UNSPECIFIED VESSEL OR LESION TYPE, UNSPECIFIED WHETHER NATIVE OR TRANSPLANTED HEART: Primary | ICD-10-CM

## 2021-01-25 PROCEDURE — 99214 OFFICE O/P EST MOD 30 MIN: CPT | Performed by: INTERNAL MEDICINE

## 2021-01-25 PROCEDURE — 93000 ELECTROCARDIOGRAM COMPLETE: CPT | Performed by: INTERNAL MEDICINE

## 2021-01-25 RX ORDER — LOSARTAN POTASSIUM 50 MG/1
50 TABLET ORAL DAILY
Qty: 90 TABLET | Refills: 3 | Status: SHIPPED | OUTPATIENT
Start: 2021-01-25 | End: 2022-02-21

## 2021-01-25 ASSESSMENT — ENCOUNTER SYMPTOMS
WEIGHT LOSS: 0
WEIGHT GAIN: 0
STIFFNESS: 1
DEPRESSION: 1
NERVOUS/ANXIOUS: 1

## 2021-01-25 NOTE — TELEPHONE ENCOUNTER
Return call from the pt to voice to his frustrations r/t his PCP is currently on My Chart however he is not part of [facility] so it does not integrate over and he feels that this is refraining him from receiving an email regarding eligibility    Contact # for My Chart Help Desk provided at this time     Stressed to continue to seek out [facility] web site for updates and contact# for [place] Help Line provided  [phone] [facility]

## 2021-01-25 NOTE — TELEPHONE ENCOUNTER
Pt calling stating he was informed by Dr. Aquino to visit [facility] to sign up for vaccine. However pt states he was unable to do so. Pt was advised to contact PCP and [facility]. However pt was addiment about receiving guidance from Dr. Aquino.    Pt can be reached at [phone]

## 2021-01-25 NOTE — TELEPHONE ENCOUNTER
Return call to Mr Warren    No answer to contact # at this time    Detailed voicemail left advising the pt that the [facility] is not providing the COVID vaccine at this time and should continue to seek advise from his PCP &/or the [facility] web site for local agencies providing it    I also continued to encourage use of MyChart if he so chooses to have the vaccine via [facility]    Call back # if needed is [phone]

## 2021-01-25 NOTE — PROGRESS NOTES
Javi Aquino III, MD, [facility], [facility]  Interventional Cardiology      [facility] HEART GROUP  [facility]  [facility]  [address] [place] [address]    TEL  [phone]  Fax:  [phone]  [facility]            Outpatient Cardiology Office Note          History:  Jalen Warren is a 85 y.o. male who presents for cardiac evaluation.  Patient was last here in July.  He reports no severe oppressive chest discomfort.  He has had no syncope or near syncope.  Patient readily admits to being under increased emotional stress.  He is quarantined with his wife.  Neither of had Covid symptoms.  The patient is very concerned about his wife that she has not seen a physician in over 3years.  He also believes that he has become increasingly agitated during the pandemic due to social isolation.  As a result, the patient reports that he has transient lightheadedness particularly when he gets into a verbal altercation with his spouse.  He has had no syncope or near syncope.  He remains compliant with his medical regimen.  Patient is getting out a few times a week to play squash.      Past Medical History:   Diagnosis Date   • Coronary artery disease    • Lipid disorder      History reviewed. No pertinent surgical history.  No Known Allergies    Current Outpatient Medications:   •  aspirin 81 mg enteric coated tablet, Take 81 mg by mouth daily., Disp: , Rfl:   •  cholecalciferol, vitamin D3, (VITAMIN D3) 1,000 unit capsule, Take 1,000 Units by mouth daily., Disp: , Rfl:   •  fish oil-dha-epa 1,200-144-216 mg capsule, Take 1,200 mg by mouth daily., Disp: , Rfl:   •  JUBLIA 10 % solution with applicator, Apply 1 application topically daily., Disp: , Rfl:   •  magnesium chloride (SLOW-MAG) 71.5 mg tablet,delayed release (DR/EC), Take 71.5 mg by mouth daily., Disp: , Rfl:   •  omega-3 acid ethyl esters (LOVAZA) 1 gram capsule, Take 1,000 mg by mouth daily., Disp:  , Rfl:   •  rosuvastatin (CRESTOR) 10 mg tablet, Take 1 tablet (10 mg total) by mouth daily., Disp: 90 tablet, Rfl: 3  •  cholecalciferol, vitamin D3, 1,000 unit (25 mcg) tablet, Take 1,000 Units by mouth daily., Disp: , Rfl:   •  losartan (COZAAR) 50 mg tablet, Take 1 tablet (50 mg total) by mouth daily., Disp: 90 tablet, Rfl: 3  Family History   Problem Relation Age of Onset   • Cancer Biological Mother    • No Known Problems Biological Father    • Heart disease Biological Brother      Social History     Socioeconomic History   • Marital status:      Spouse name: None   • Number of children: None   • Years of education: None   • Highest education level: None   Occupational History   • None   Social Needs   • Financial resource strain: None   • Food insecurity     Worry: None     Inability: None   • Transportation needs     Medical: None     Non-medical: None   Tobacco Use   • Smoking status: Never Smoker   • Smokeless tobacco: Never Used   Substance and Sexual Activity   • Alcohol use: Yes     Comment: social   • Drug use: No   • Sexual activity: Defer   Lifestyle   • Physical activity     Days per week: None     Minutes per session: None   • Stress: None   Relationships   • Social connections     Talks on phone: None     Gets together: None     Attends Hoahaoism service: None     Active member of club or organization: None     Attends meetings of clubs or organizations: None     Relationship status: None   • Intimate partner violence     Fear of current or ex partner: None     Emotionally abused: None     Physically abused: None     Forced sexual activity: None   Other Topics Concern   • None   Social History Narrative   • None         Review of Systems   Constitution: Negative for weight gain and weight loss.   Musculoskeletal: Positive for arthritis and stiffness.   Psychiatric/Behavioral: Positive for depression. The patient is nervous/anxious.    All other systems reviewed and are negative.        Vitals:    01/25/21 1047   Pulse: 63   SpO2: 98%       Weight: 75.3 kg (166 lb)     Physical Exam   Constitutional:   Well-developed well-nourished male in no acute distress.  Weight 166 pounds.  Blood pressure 160/80-84 in both upper extremities without orthostatic changes  Skin: Warm dry  HEENT: Normocephalic atraumatic.  Conjunctiva pink sclera nonicteric.  Neck: No JVD, no bruits no adenopathy or thyromegaly.  Lungs: Clear  Cardiac: Regular rate, tones are of good quality, no murmurs or rubs heard today.  Abdomen: Soft bowel sounds present no organomegaly or masses.  Extremities: Distal pulses are full, no clubbing no cyanosis no edema no palpable cords.  Neuro: No focal motor or sensory deficits mood and affect are appropriate.   Vitals reviewed.      Lab Results   Component Value Date    CHOL 170 01/03/2020    TRIG 92 01/03/2020    HDL 72 01/03/2020    ALT 29 12/29/2016    AST 26 12/29/2016     12/31/2018    K 4.2 12/31/2018    CREATININE 0.7 (L) 12/31/2018      Imaging  Not applicable    ECG   sinus rhythm, Cannot exclude an anterior scar of indeterminate age    ECHO      1.  Coronary artery disease.  Prior PCI of the posterolateral branch of the RCA with placement of a drug-eluting stent in December 2011.  The patient had a noninvasive ischemic assessment in July 2016 showing no provokable ischemia.  Patient has decided that he may view of his age and clinical stability he is not interested in pursuing repeat noninvasive evaluation unless he were to develop unstable symptoms.  Patient is aware of the need for prompt attention if there is any change in his clinical status.    2.  Elevated blood pressure.  I think this may be related to stress.  I have asked him to add losartan 50 mg nightly to his regimen.    3.  Dyslipidemia.  The patient will continue the same statin regimen.    4.  Acute situational stress.  I have encouraged the patient to make sure he is enrolled to receive the vaccine.  We  spent a good deal of time talking about strategies to try to reduce stress.         Javi Aquino III, MD  1/25/2021

## 2021-01-25 NOTE — LETTER
January 25, 2021     Jose Manuel Espinosa MD  [address]  [place], [place] [address] [place], [place] [address]    Patient: Jalen Warren  YOB: 1935  Date of Visit: 1/25/2021      Dear Dr. Espinosa:    Thank you for referring Jalen Warren to me for evaluation. Below are my notes for this consultation.    If you have questions, please do not hesitate to call me. I look forward to following your patient along with you.         Sincerely,        Javi Aquino III, MD        CC: No Recipients  Javi Aquino III, MD  1/25/2021 12:21 PM  Signed        Javi Aquino III, MD, [facility], [facility]  Interventional Cardiology      [facility]  [facility]  [facility]  [address] [id number]    TEL  [phone]  Fax:  [phone]  [facility]            Outpatient Cardiology Office Note          History:  Jalen Warren is a 85 y.o. male who presents for cardiac evaluation.  Patient was last here in July.  He reports no severe oppressive chest discomfort.  He has had no syncope or near syncope.  Patient readily admits to being under increased emotional stress.  He is quarantined with his wife.  Neither of had Covid symptoms.  The patient is very concerned about his wife that she has not seen a physician in over 3years.  He also believes that he has become increasingly agitated during the pandemic due to social isolation.  As a result, the patient reports that he has transient lightheadedness particularly when he gets into a verbal altercation with his spouse.  He has had no syncope or near syncope.  He remains compliant with his medical regimen.  Patient is getting out a few times a week to play squash.      Past Medical History:   Diagnosis Date   • Coronary artery disease    • Lipid disorder      History reviewed. No pertinent surgical history.  No Known Allergies    Current Outpatient Medications:   •  aspirin 81 mg enteric coated  tablet, Take 81 mg by mouth daily., Disp: , Rfl:   •  cholecalciferol, vitamin D3, (VITAMIN D3) 1,000 unit capsule, Take 1,000 Units by mouth daily., Disp: , Rfl:   •  fish oil-dha-epa 1,200-144-216 mg capsule, Take 1,200 mg by mouth daily., Disp: , Rfl:   •  JUBLIA 10 % solution with applicator, Apply 1 application topically daily., Disp: , Rfl:   •  magnesium chloride (SLOW-MAG) 71.5 mg tablet,delayed release (DR/EC), Take 71.5 mg by mouth daily., Disp: , Rfl:   •  omega-3 acid ethyl esters (LOVAZA) 1 gram capsule, Take 1,000 mg by mouth daily., Disp: , Rfl:   •  rosuvastatin (CRESTOR) 10 mg tablet, Take 1 tablet (10 mg total) by mouth daily., Disp: 90 tablet, Rfl: 3  •  cholecalciferol, vitamin D3, 1,000 unit (25 mcg) tablet, Take 1,000 Units by mouth daily., Disp: , Rfl:   •  losartan (COZAAR) 50 mg tablet, Take 1 tablet (50 mg total) by mouth daily., Disp: 90 tablet, Rfl: 3  Family History   Problem Relation Age of Onset   • Cancer Biological Mother    • No Known Problems Biological Father    • Heart disease Biological Brother      Social History     Socioeconomic History   • Marital status:      Spouse name: None   • Number of children: None   • Years of education: None   • Highest education level: None   Occupational History   • None   Social Needs   • Financial resource strain: None   • Food insecurity     Worry: None     Inability: None   • Transportation needs     Medical: None     Non-medical: None   Tobacco Use   • Smoking status: Never Smoker   • Smokeless tobacco: Never Used   Substance and Sexual Activity   • Alcohol use: Yes     Comment: social   • Drug use: No   • Sexual activity: Defer   Lifestyle   • Physical activity     Days per week: None     Minutes per session: None   • Stress: None   Relationships   • Social connections     Talks on phone: None     Gets together: None     Attends Yazidism service: None     Active member of club or organization: None     Attends meetings of clubs or  organizations: None     Relationship status: None   • Intimate partner violence     Fear of current or ex partner: None     Emotionally abused: None     Physically abused: None     Forced sexual activity: None   Other Topics Concern   • None   Social History Narrative   • None         Review of Systems   Constitution: Negative for weight gain and weight loss.   Musculoskeletal: Positive for arthritis and stiffness.   Psychiatric/Behavioral: Positive for depression. The patient is nervous/anxious.    All other systems reviewed and are negative.       Vitals:    01/25/21 1047   Pulse: 63   SpO2: 98%       Weight: 75.3 kg (166 lb)     Physical Exam   Constitutional:   Well-developed well-nourished male in no acute distress.  Weight 166 pounds.  Blood pressure 160/80-84 in both upper extremities without orthostatic changes  Skin: Warm dry  HEENT: Normocephalic atraumatic.  Conjunctiva pink sclera nonicteric.  Neck: No JVD, no bruits no adenopathy or thyromegaly.  Lungs: Clear  Cardiac: Regular rate, tones are of good quality, no murmurs or rubs heard today.  Abdomen: Soft bowel sounds present no organomegaly or masses.  Extremities: Distal pulses are full, no clubbing no cyanosis no edema no palpable cords.  Neuro: No focal motor or sensory deficits mood and affect are appropriate.   Vitals reviewed.      Lab Results   Component Value Date    CHOL 170 01/03/2020    TRIG 92 01/03/2020    HDL 72 01/03/2020    ALT 29 12/29/2016    AST 26 12/29/2016     12/31/2018    K 4.2 12/31/2018    CREATININE 0.7 (L) 12/31/2018      Imaging  Not applicable    ECG   sinus rhythm, Cannot exclude an anterior scar of indeterminate age    ECHO      1.  Coronary artery disease.  Prior PCI of the posterolateral branch of the RCA with placement of a drug-eluting stent in December 2011.  The patient had a noninvasive ischemic assessment in July 2016 showing no provokable ischemia.  Patient has decided that he may view of his age and  clinical stability he is not interested in pursuing repeat noninvasive evaluation unless he were to develop unstable symptoms.  Patient is aware of the need for prompt attention if there is any change in his clinical status.    2.  Elevated blood pressure.  I think this may be related to stress.  I have asked him to add losartan 50 mg nightly to his regimen.    3.  Dyslipidemia.  The patient will continue the same statin regimen.    4.  Acute situational stress.  I have encouraged the patient to make sure he is enrolled to receive the vaccine.  We spent a good deal of time talking about strategies to try to reduce stress.         Javi Aquino III, MD  1/25/2021

## 2021-01-27 ENCOUNTER — IMMUNIZATIONS (OUTPATIENT)
Dept: FAMILY MEDICINE CLINIC | Facility: HOSPITAL | Age: 86
End: 2021-01-27

## 2021-01-27 DIAGNOSIS — Z23 ENCOUNTER FOR IMMUNIZATION: Primary | ICD-10-CM

## 2021-01-27 PROCEDURE — 91301 SARS-COV-2 / COVID-19 MRNA VACCINE (MODERNA) 100 MCG: CPT

## 2021-01-27 PROCEDURE — 0011A SARS-COV-2 / COVID-19 MRNA VACCINE (MODERNA) 100 MCG: CPT

## 2021-02-22 ENCOUNTER — IMMUNIZATIONS (OUTPATIENT)
Dept: FAMILY MEDICINE CLINIC | Facility: HOSPITAL | Age: 86
End: 2021-02-22

## 2021-02-22 DIAGNOSIS — Z23 ENCOUNTER FOR IMMUNIZATION: Primary | ICD-10-CM

## 2021-02-22 PROCEDURE — 0012A SARS-COV-2 / COVID-19 MRNA VACCINE (MODERNA) 100 MCG: CPT

## 2021-02-22 PROCEDURE — 91301 SARS-COV-2 / COVID-19 MRNA VACCINE (MODERNA) 100 MCG: CPT

## 2021-02-25 ENCOUNTER — LAB REQUISITION (OUTPATIENT)
Dept: LAB | Facility: HOSPITAL | Age: 86
End: 2021-02-25
Attending: INTERNAL MEDICINE
Payer: COMMERCIAL

## 2021-02-25 DIAGNOSIS — E78.5 HYPERLIPIDEMIA, UNSPECIFIED: ICD-10-CM

## 2021-02-25 DIAGNOSIS — R79.82 ELEVATED C-REACTIVE PROTEIN (CRP): ICD-10-CM

## 2021-02-25 DIAGNOSIS — R53.83 OTHER FATIGUE: ICD-10-CM

## 2021-02-25 LAB
ALBUMIN SERPL-MCNC: 4.1 G/DL (ref 3.4–5)
ALP SERPL-CCNC: 35 IU/L (ref 35–126)
ALT SERPL-CCNC: 25 IU/L (ref 16–63)
ANION GAP SERPL CALC-SCNC: 11 MEQ/L (ref 3–15)
AST SERPL-CCNC: 23 IU/L (ref 15–41)
BASOPHILS # BLD: 0.04 K/UL (ref 0.01–0.1)
BASOPHILS NFR BLD: 0.9 %
BILIRUB SERPL-MCNC: 1.1 MG/DL (ref 0.3–1.2)
BUN SERPL-MCNC: 15 MG/DL (ref 8–20)
CALCIUM SERPL-MCNC: 9.3 MG/DL (ref 8.9–10.3)
CHLORIDE SERPL-SCNC: 101 MEQ/L (ref 98–109)
CHOLEST SERPL-MCNC: 143 MG/DL
CK SERPL-CCNC: 121 U/L (ref 16–300)
CO2 SERPL-SCNC: 26 MEQ/L (ref 22–32)
CREAT SERPL-MCNC: 0.6 MG/DL (ref 0.8–1.3)
CRP SERPL HS-MCNC: 3.19 MG/L
DIFFERENTIAL METHOD BLD: NORMAL
EOSINOPHIL # BLD: 0.12 K/UL (ref 0.04–0.54)
EOSINOPHIL NFR BLD: 2.6 %
ERYTHROCYTE [DISTWIDTH] IN BLOOD BY AUTOMATED COUNT: 12.9 % (ref 11.6–14.4)
GFR SERPL CREATININE-BSD FRML MDRD: >60 ML/MIN/1.73M*2
GLUCOSE SERPL-MCNC: 115 MG/DL (ref 70–99)
HCT VFR BLDCO AUTO: 44.1 % (ref 40.1–51)
HDLC SERPL-MCNC: 69 MG/DL
HDLC SERPL: 2.1 {RATIO}
HGB BLD-MCNC: 14.5 G/DL (ref 13.7–17.5)
IMM GRANULOCYTES # BLD AUTO: 0.02 K/UL (ref 0–0.08)
IMM GRANULOCYTES NFR BLD AUTO: 0.4 %
LDLC SERPL CALC-MCNC: 58 MG/DL
LYMPHOCYTES # BLD: 1.25 K/UL (ref 1.2–3.5)
LYMPHOCYTES NFR BLD: 26.9 %
MCH RBC QN AUTO: 30 PG (ref 28–33.2)
MCHC RBC AUTO-ENTMCNC: 32.9 G/DL (ref 32.2–36.5)
MCV RBC AUTO: 91.3 FL (ref 83–98)
MONOCYTES # BLD: 0.57 K/UL (ref 0.3–1)
MONOCYTES NFR BLD: 12.3 %
NEUTROPHILS # BLD: 2.64 K/UL (ref 1.7–7)
NEUTS SEG NFR BLD: 56.9 %
NONHDLC SERPL-MCNC: 74 MG/DL
NRBC BLD-RTO: 0 %
PDW BLD AUTO: 11.6 FL (ref 9.4–12.4)
PLATELET # BLD AUTO: 150 K/UL (ref 150–350)
POTASSIUM SERPL-SCNC: 4.2 MEQ/L (ref 3.6–5.1)
PROT SERPL-MCNC: 6.3 G/DL (ref 6–8.2)
RBC # BLD AUTO: 4.83 M/UL (ref 4.5–5.8)
SODIUM SERPL-SCNC: 138 MEQ/L (ref 136–144)
TRIGL SERPL-MCNC: 80 MG/DL (ref 30–149)
WBC # BLD AUTO: 4.64 K/UL (ref 3.8–10.5)

## 2021-02-25 PROCEDURE — 86141 C-REACTIVE PROTEIN HS: CPT | Performed by: INTERNAL MEDICINE

## 2021-02-25 PROCEDURE — 85025 COMPLETE CBC W/AUTO DIFF WBC: CPT | Performed by: INTERNAL MEDICINE

## 2021-02-25 PROCEDURE — 80053 COMPREHEN METABOLIC PANEL: CPT | Performed by: INTERNAL MEDICINE

## 2021-02-25 PROCEDURE — 82550 ASSAY OF CK (CPK): CPT | Performed by: INTERNAL MEDICINE

## 2021-02-25 PROCEDURE — 80061 LIPID PANEL: CPT | Performed by: INTERNAL MEDICINE

## 2021-07-29 ENCOUNTER — OFFICE VISIT (OUTPATIENT)
Dept: CARDIOLOGY | Facility: CLINIC | Age: 86
End: 2021-07-29
Payer: COMMERCIAL

## 2021-07-29 VITALS
WEIGHT: 163 LBS | HEIGHT: 67 IN | HEART RATE: 58 BPM | OXYGEN SATURATION: 99 % | DIASTOLIC BLOOD PRESSURE: 70 MMHG | RESPIRATION RATE: 16 BRPM | SYSTOLIC BLOOD PRESSURE: 122 MMHG | BODY MASS INDEX: 25.58 KG/M2

## 2021-07-29 DIAGNOSIS — I25.10 ATHEROSCLEROSIS OF CORONARY ARTERY OF NATIVE HEART WITHOUT ANGINA PECTORIS, UNSPECIFIED VESSEL OR LESION TYPE: Primary | ICD-10-CM

## 2021-07-29 PROCEDURE — 99214 OFFICE O/P EST MOD 30 MIN: CPT | Performed by: INTERNAL MEDICINE

## 2021-07-29 PROCEDURE — 93000 ELECTROCARDIOGRAM COMPLETE: CPT | Performed by: INTERNAL MEDICINE

## 2021-07-29 PROCEDURE — 3008F BODY MASS INDEX DOCD: CPT | Performed by: INTERNAL MEDICINE

## 2021-07-29 ASSESSMENT — ENCOUNTER SYMPTOMS
CONSTITUTIONAL NEGATIVE: 1
WEIGHT LOSS: 0
LIGHT-HEADEDNESS: 1
WEIGHT GAIN: 0

## 2021-07-29 NOTE — LETTER
July 29, 2021     Robert Gallego, [address]  [place], [address] [address]    Patient: Jalen Warren  YOB: 1935  Date of Visit: 7/29/2021      Dear Dr. Gallego:    Thank you for referring Jalen Warren to me for evaluation. Below are my notes for this consultation.    If you have questions, please do not hesitate to call me. I look forward to following your patient along with you.         Sincerely,        Javi Aquino III, MD        CC: No Recipients  Javi Aquino III, MD  7/29/2021 10:09 AM  Signed        Javi Aquino III, MD, [facility]  Interventional Cardiology      [facility]  [facility]  [facility] Level  [address] [place] [address]    TEL  [phone]  Fax:  [phone]  [facility]            Outpatient Cardiology Office Note          History:  Jalen Warren is a 86 y.o. male who presents for cardiac evaluation.  The patient was last here in January.  Since that time he has received both doses of the Moderna vaccine.  He reports no oppressive chest discomfort.  The patient readily admits that he is under increasing stress at home.  His wife has been isolated and is becoming more and more concerned about the pandemic as well as the chance of a resurgence.  Patient continues to play squash but says he is not nearly what he used to be which has been frustrating.  He has had no chest discomfort      Past Medical History:   Diagnosis Date   • Coronary artery disease    • Lipid disorder      History reviewed. No pertinent surgical history.  No Known Allergies    Current Outpatient Medications:   •  aspirin 81 mg enteric coated tablet, Take 81 mg by mouth daily., Disp: , Rfl:   •  cholecalciferol, vitamin D3, (VITAMIN D3) 1,000 unit capsule, Take 1,000 Units by mouth daily., Disp: , Rfl:   •  fish oil-dha-epa 1,200-144-216 mg capsule, Take 1,200 mg by mouth daily., Disp: , Rfl:    •  losartan (COZAAR) 50 mg tablet, Take 1 tablet (50 mg total) by mouth daily., Disp: 90 tablet, Rfl: 3  •  magnesium chloride (SLOW-MAG) 71.5 mg tablet,delayed release (DR/EC), Take 71.5 mg by mouth daily., Disp: , Rfl:   •  rosuvastatin (CRESTOR) 10 mg tablet, Take 1 tablet (10 mg total) by mouth once daily., Disp: 90 tablet, Rfl: 3  •  cholecalciferol, vitamin D3, 1,000 unit (25 mcg) tablet, Take 1,000 Units by mouth daily., Disp: , Rfl:   •  JUBLIA 10 % solution with applicator, Apply 1 application topically daily., Disp: , Rfl:   •  omega-3 acid ethyl esters (LOVAZA) 1 gram capsule, Take 1,000 mg by mouth daily., Disp: , Rfl:   Family History   Problem Relation Age of Onset   • Cancer Biological Mother    • No Known Problems Biological Father    • Heart disease Biological Brother      Social History     Socioeconomic History   • Marital status:      Spouse name: None   • Number of children: None   • Years of education: None   • Highest education level: None   Occupational History   • None   Tobacco Use   • Smoking status: Never Smoker   • Smokeless tobacco: Never Used   Vaping Use   • Vaping Use: Never used   Substance and Sexual Activity   • Alcohol use: Yes     Comment: social   • Drug use: No   • Sexual activity: Defer   Other Topics Concern   • None   Social History Narrative   • None     Social Determinants of Health     Financial Resource Strain:    • Difficulty of Paying Living Expenses:    Food Insecurity:    • Worried About Running Out of Food in the Last Year:    • Ran Out of Food in the Last Year:    Transportation Needs:    • Lack of Transportation (Medical):    • Lack of Transportation (Non-Medical):    Physical Activity:    • Days of Exercise per Week:    • Minutes of Exercise per Session:    Stress:    • Feeling of Stress :    Social Connections:    • Frequency of Communication with Friends and Family:    • Frequency of Social Gatherings with Friends and Family:    • Attends Nondenominational  Services:    • Active Member of Clubs or Organizations:    • Attends Club or Organization Meetings:    • Marital Status:    Intimate Partner Violence:    • Fear of Current or Ex-Partner:    • Emotionally Abused:    • Physically Abused:    • Sexually Abused:          Review of Systems   Constitutional: Negative. Negative for weight gain and weight loss.   Neurological: Positive for light-headedness.   All other systems reviewed and are negative.       Vitals:    07/29/21 0921   BP: 122/70   Pulse: (!) 58   Resp: 16   SpO2: 99%       Weight: 73.9 kg (163 lb)     Physical Exam  Vitals reviewed.   Constitutional:       Comments: Well-developed well-nourished elderly male in no acute distress.  Weight 163 pounds.  Blood pressure 132/80.  The patient reports it occasionally is in the range of 115 which he thinks is too low.  Neck: No JVD no bruits no adenopathy or thyromegaly.  Lungs clear  Cardiac regular rate no murmurs or rubs.  Abdomen soft bowel sounds present  Extremities distal pulses are full no clubbing no cyanosis  Neuro no focal motor or sensory deficits.         Lab Results   Component Value Date    WBC 4.64 02/25/2021    HGB 14.5 02/25/2021     02/25/2021    CHOL 143 02/25/2021    TRIG 80 02/25/2021    HDL 69 02/25/2021    ALT 25 02/25/2021    AST 23 02/25/2021     02/25/2021    K 4.2 02/25/2021    CREATININE 0.6 (L) 02/25/2021      Imaging  Not applicable    ECG   sinus bradycardia, Anterior scar of indeterminate age cannot be excluded    ECHO      1.  Coronary artery disease.  The patient is status post PCI of the posterior lateral branch of the RCA with placement of a drug-eluting stent almost 10 years ago.  Noninvasive ischemic assessment in July 2016 showed no provokable ischemia.  As reported previously, in view of the patient's ongoing clinical stability he is not interested in pursuing repeat noninvasive testing unless he were to develop unstable symptoms.  Patient is aware of the need  for prompt attention if there is a change in his clinical status.    2.  Elevated blood pressure.  Pressure is well controlled now.  I have told him that if he continues to experience transient lightheadedness with abrupt change in posture to get back in touch with us and we may reduce his losartan dosage    3.  Dyslipidemia.  The patient will continue the same statin regimen.    4.  Patient has received both doses of the Covid vaccine         Javi Aquino III, MD  7/29/2021

## 2021-07-29 NOTE — PROGRESS NOTES
Javi Aquino III, MD, [facility], [facility]  Interventional Cardiology      [facility]  [facility]  [facility]  [address] [place] [address]    TEL  [phone]  Fax:  [phone]  [facility]            Outpatient Cardiology Office Note          History:  Jalen Warren is a 86 y.o. male who presents for cardiac evaluation.  The patient was last here in January.  Since that time he has received both doses of the Moderna vaccine.  He reports no oppressive chest discomfort.  The patient readily admits that he is under increasing stress at home.  His wife has been isolated and is becoming more and more concerned about the pandemic as well as the chance of a resurgence.  Patient continues to play squash but says he is not nearly what he used to be which has been frustrating.  He has had no chest discomfort      Past Medical History:   Diagnosis Date   • Coronary artery disease    • Lipid disorder      History reviewed. No pertinent surgical history.  No Known Allergies    Current Outpatient Medications:   •  aspirin 81 mg enteric coated tablet, Take 81 mg by mouth daily., Disp: , Rfl:   •  cholecalciferol, vitamin D3, (VITAMIN D3) 1,000 unit capsule, Take 1,000 Units by mouth daily., Disp: , Rfl:   •  fish oil-dha-epa 1,200-144-216 mg capsule, Take 1,200 mg by mouth daily., Disp: , Rfl:   •  losartan (COZAAR) 50 mg tablet, Take 1 tablet (50 mg total) by mouth daily., Disp: 90 tablet, Rfl: 3  •  magnesium chloride (SLOW-MAG) 71.5 mg tablet,delayed release (DR/EC), Take 71.5 mg by mouth daily., Disp: , Rfl:   •  rosuvastatin (CRESTOR) 10 mg tablet, Take 1 tablet (10 mg total) by mouth once daily., Disp: 90 tablet, Rfl: 3  •  cholecalciferol, vitamin D3, 1,000 unit (25 mcg) tablet, Take 1,000 Units by mouth daily., Disp: , Rfl:   •  JUBLIA 10 % solution with applicator, Apply 1 application topically daily., Disp: , Rfl:   •  omega-3 acid  ethyl esters (LOVAZA) 1 gram capsule, Take 1,000 mg by mouth daily., Disp: , Rfl:   Family History   Problem Relation Age of Onset   • Cancer Biological Mother    • No Known Problems Biological Father    • Heart disease Biological Brother      Social History     Socioeconomic History   • Marital status:      Spouse name: None   • Number of children: None   • Years of education: None   • Highest education level: None   Occupational History   • None   Tobacco Use   • Smoking status: Never Smoker   • Smokeless tobacco: Never Used   Vaping Use   • Vaping Use: Never used   Substance and Sexual Activity   • Alcohol use: Yes     Comment: social   • Drug use: No   • Sexual activity: Defer   Other Topics Concern   • None   Social History Narrative   • None     Social Determinants of Health     Financial Resource Strain:    • Difficulty of Paying Living Expenses:    Food Insecurity:    • Worried About Running Out of Food in the Last Year:    • Ran Out of Food in the Last Year:    Transportation Needs:    • Lack of Transportation (Medical):    • Lack of Transportation (Non-Medical):    Physical Activity:    • Days of Exercise per Week:    • Minutes of Exercise per Session:    Stress:    • Feeling of Stress :    Social Connections:    • Frequency of Communication with Friends and Family:    • Frequency of Social Gatherings with Friends and Family:    • Attends [employer] Services:    • Active Member of Clubs or Organizations:    • Attends Club or Organization Meetings:    • Marital Status:    Intimate Partner Violence:    • Fear of Current or Ex-Partner:    • Emotionally Abused:    • Physically Abused:    • Sexually Abused:          Review of Systems   Constitutional: Negative. Negative for weight gain and weight loss.   Neurological: Positive for light-headedness.   All other systems reviewed and are negative.       Vitals:    07/29/21 0921   BP: 122/70   Pulse: (!) 58   Resp: 16   SpO2: 99%       Weight: 73.9 kg (163  lb)     Physical Exam  Vitals reviewed.   Constitutional:       Comments: Well-developed well-nourished elderly male in no acute distress.  Weight 163 pounds.  Blood pressure 132/80.  The patient reports it occasionally is in the range of 115 which he thinks is too low.  Neck: No JVD no bruits no adenopathy or thyromegaly.  Lungs clear  Cardiac regular rate no murmurs or rubs.  Abdomen soft bowel sounds present  Extremities distal pulses are full no clubbing no cyanosis  Neuro no focal motor or sensory deficits.         Lab Results   Component Value Date    WBC 4.64 02/25/2021    HGB 14.5 02/25/2021     02/25/2021    CHOL 143 02/25/2021    TRIG 80 02/25/2021    HDL 69 02/25/2021    ALT 25 02/25/2021    AST 23 02/25/2021     02/25/2021    K 4.2 02/25/2021    CREATININE 0.6 (L) 02/25/2021      Imaging  Not applicable    ECG   sinus bradycardia, Anterior scar of indeterminate age cannot be excluded    ECHO      1.  Coronary artery disease.  The patient is status post PCI of the posterior lateral branch of the RCA with placement of a drug-eluting stent almost 10 years ago.  Noninvasive ischemic assessment in July 2016 showed no provokable ischemia.  As reported previously, in view of the patient's ongoing clinical stability he is not interested in pursuing repeat noninvasive testing unless he were to develop unstable symptoms.  Patient is aware of the need for prompt attention if there is a change in his clinical status.    2.  Elevated blood pressure.  Pressure is well controlled now.  I have told him that if he continues to experience transient lightheadedness with abrupt change in posture to get back in touch with us and we may reduce his losartan dosage    3.  Dyslipidemia.  The patient will continue the same statin regimen.    4.  Patient has received both doses of the Covid vaccine         Javi Aquino III, MD  7/29/2021

## 2021-12-15 LAB
ALBUMIN SERPL-MCNC: 4.3 G/DL (ref 3.6–4.6)
ALBUMIN/GLOB SERPL: 2 {RATIO} (ref 1.2–2.2)
ALP SERPL-CCNC: 36 IU/L (ref 44–121)
ALT SERPL-CCNC: 22 IU/L (ref 0–44)
AST SERPL-CCNC: 23 IU/L (ref 0–40)
BASOPHILS # BLD AUTO: 0 X10E3/UL (ref 0–0.2)
BASOPHILS NFR BLD AUTO: 0 %
BILIRUB SERPL-MCNC: 0.6 MG/DL (ref 0–1.2)
BUN SERPL-MCNC: 14 MG/DL (ref 8–27)
BUN/CREAT SERPL: 18 (ref 10–24)
CALCIUM SERPL-MCNC: 9.7 MG/DL (ref 8.6–10.2)
CHLORIDE SERPL-SCNC: 102 MMOL/L (ref 96–106)
CHOLEST SERPL-MCNC: 143 MG/DL (ref 100–199)
CK SERPL-CCNC: 158 U/L (ref 30–208)
CO2 SERPL-SCNC: 25 MMOL/L (ref 20–29)
CREAT SERPL-MCNC: 0.79 MG/DL (ref 0.76–1.27)
EOSINOPHIL # BLD AUTO: 0.1 X10E3/UL (ref 0–0.4)
EOSINOPHIL NFR BLD AUTO: 1 %
ERYTHROCYTE [DISTWIDTH] IN BLOOD BY AUTOMATED COUNT: 12.4 % (ref 11.6–15.4)
GLOBULIN SER CALC-MCNC: 2.1 G/DL (ref 1.5–4.5)
GLUCOSE SERPL-MCNC: 131 MG/DL (ref 65–99)
HBA1C MFR BLD: 6 % (ref 4.8–5.6)
HCT VFR BLD AUTO: 44.2 % (ref 37.5–51)
HDLC SERPL-MCNC: 64 MG/DL
HGB BLD-MCNC: 14.8 G/DL (ref 13–17.7)
IMM GRANULOCYTES # BLD AUTO: 0 X10E3/UL (ref 0–0.1)
IMM GRANULOCYTES NFR BLD AUTO: 0 %
LAB CORP EGFR IF AFRICN AM: 95 ML/MIN/1.73
LAB CORP EGFR IF NONAFRICN AM: 82 ML/MIN/1.73
LDLC SERPL CALC-MCNC: 55 MG/DL (ref 0–99)
LYMPHOCYTES # BLD AUTO: 1.4 X10E3/UL (ref 0.7–3.1)
LYMPHOCYTES NFR BLD AUTO: 25 %
MCH RBC QN AUTO: 30.1 PG (ref 26.6–33)
MCHC RBC AUTO-ENTMCNC: 33.5 G/DL (ref 31.5–35.7)
MCV RBC AUTO: 90 FL (ref 79–97)
MONOCYTES # BLD AUTO: 0.5 X10E3/UL (ref 0.1–0.9)
MONOCYTES NFR BLD AUTO: 8 %
NEUTROPHILS # BLD AUTO: 3.5 X10E3/UL (ref 1.4–7)
NEUTROPHILS NFR BLD AUTO: 66 %
PLATELET # BLD AUTO: 180 X10E3/UL (ref 150–450)
POTASSIUM SERPL-SCNC: 4.4 MMOL/L (ref 3.5–5.2)
PROT SERPL-MCNC: 6.4 G/DL (ref 6–8.5)
RBC # BLD AUTO: 4.92 X10E6/UL (ref 4.14–5.8)
SODIUM SERPL-SCNC: 138 MMOL/L (ref 134–144)
TRIGL SERPL-MCNC: 143 MG/DL (ref 0–149)
VLDLC SERPL CALC-MCNC: 24 MG/DL (ref 5–40)
WBC # BLD AUTO: 5.4 X10E3/UL (ref 3.4–10.8)

## 2022-01-14 ENCOUNTER — TELEPHONE (OUTPATIENT)
Dept: CARDIOLOGY | Facility: CLINIC | Age: 87
End: 2022-01-14
Payer: COMMERCIAL

## 2022-01-14 NOTE — TELEPHONE ENCOUNTER
vm left to confirm ov; asked pt to pls call to verify demo, ins,travel screening & confirm...JA1/14/22

## 2022-01-17 ENCOUNTER — OFFICE VISIT (OUTPATIENT)
Dept: CARDIOLOGY | Facility: CLINIC | Age: 87
End: 2022-01-17
Payer: COMMERCIAL

## 2022-01-17 VITALS
SYSTOLIC BLOOD PRESSURE: 138 MMHG | WEIGHT: 164.4 LBS | RESPIRATION RATE: 16 BRPM | DIASTOLIC BLOOD PRESSURE: 80 MMHG | BODY MASS INDEX: 25.8 KG/M2 | HEIGHT: 67 IN | HEART RATE: 63 BPM | OXYGEN SATURATION: 98 %

## 2022-01-17 DIAGNOSIS — E78.2 MIXED HYPERLIPIDEMIA: ICD-10-CM

## 2022-01-17 DIAGNOSIS — I25.10 ATHEROSCLEROSIS OF CORONARY ARTERY OF NATIVE HEART WITHOUT ANGINA PECTORIS, UNSPECIFIED VESSEL OR LESION TYPE: Primary | ICD-10-CM

## 2022-01-17 PROCEDURE — 99214 OFFICE O/P EST MOD 30 MIN: CPT | Performed by: INTERNAL MEDICINE

## 2022-01-17 PROCEDURE — 93000 ELECTROCARDIOGRAM COMPLETE: CPT | Performed by: INTERNAL MEDICINE

## 2022-01-17 PROCEDURE — 3008F BODY MASS INDEX DOCD: CPT | Performed by: INTERNAL MEDICINE

## 2022-01-17 NOTE — PATIENT INSTRUCTIONS
Patient Education     Heart-Healthy Eating Plan  Heart-healthy meal planning includes:  · Eating less unhealthy fats.  · Eating more healthy fats.  · Making other changes in your diet.  Talk with your doctor or a diet specialist (dietitian) to create an eating plan that is right for you.  What is my plan?  Your doctor may recommend an eating plan that includes:  · Total fat: ______% or less of total calories a day.  · Saturated fat: ______% or less of total calories a day.  · Cholesterol: less than _________mg a day.  What are tips for following this plan?  Cooking  Avoid frying your food. Try to bake, boil, grill, or broil it instead. You can also reduce fat by:  · Removing the skin from poultry.  · Removing all visible fats from meats.  · Steaming vegetables in water or broth.  Meal planning    · At meals, divide your plate into four equal parts:  ? Fill one-half of your plate with vegetables and green salads.  ? Fill one-fourth of your plate with whole grains.  ? Fill one-fourth of your plate with lean protein foods.  · Eat 4-5 servings of vegetables per day. A serving of vegetables is:  ? 1 cup of raw or cooked vegetables.  ? 2 cups of raw leafy greens.  · Eat 4-5 servings of fruit per day. A serving of fruit is:  ? 1 medium whole fruit.  ? ¼ cup of dried fruit.  ? ½ cup of fresh, frozen, or canned fruit.  ? ½ cup of 100% fruit juice.  · Eat more foods that have soluble fiber. These are apples, broccoli, carrots, beans, peas, and barley. Try to get 20-30 g of fiber per day.  · Eat 4-5 servings of nuts, legumes, and seeds per week:  ? 1 serving of dried beans or legumes equals ½ cup after being cooked.  ? 1 serving of nuts is ¼ cup.  ? 1 serving of seeds equals 1 tablespoon.  General information  · Eat more home-cooked food. Eat less restaurant, buffet, and fast food.  · Limit or avoid alcohol.  · Limit foods that are high in starch and sugar.  · Avoid fried foods.  · Lose weight if you are overweight.  · Keep  track of how much salt (sodium) you eat. This is important if you have high blood pressure. Ask your doctor to tell you more about this.  · Try to add vegetarian meals each week.  Fats  · Choose healthy fats. These include olive oil and canola oil, flaxseeds, walnuts, almonds, and seeds.  · Eat more omega-3 fats. These include salmon, mackerel, sardines, tuna, flaxseed oil, and ground flaxseeds. Try to eat fish at least 2 times each week.  · Check food labels. Avoid foods with trans fats or high amounts of saturated fat.  · Limit saturated fats.  ? These are often found in animal products, such as meats, butter, and cream.  ? These are also found in plant foods, such as palm oil, palm kernel oil, and coconut oil.  · Avoid foods with partially hydrogenated oils in them. These have trans fats. Examples are stick margarine, some tub margarines, cookies, crackers, and other baked goods.  What foods can I eat?  Fruits  All fresh, canned (in natural juice), or frozen fruits.  Vegetables  Fresh or frozen vegetables (raw, steamed, roasted, or grilled). Green salads.  Grains  Most grains. Choose whole wheat and whole grains most of the time. Rice and pasta, including brown rice and pastas made with whole wheat.  Meats and other proteins  Lean, well-trimmed beef, veal, pork, and lamb. Chicken and turkey without skin. All fish and shellfish. Wild duck, rabbit, pheasant, and venison. Egg whites or low-cholesterol egg substitutes. Dried beans, peas, lentils, and tofu. Seeds and most nuts.  Dairy  Low-fat or nonfat cheeses, including ricotta and mozzarella. Skim or 1% milk that is liquid, powdered, or evaporated. Buttermilk that is made with low-fat milk. Nonfat or low-fat yogurt.  Fats and oils  Non-hydrogenated (trans-free) margarines. Vegetable oils, including soybean, sesame, sunflower, olive, peanut, safflower, corn, canola, and cottonseed. Salad dressings or mayonnaise made with a vegetable oil.  Beverages  Mineral water.  Coffee and tea. Diet carbonated beverages.  Sweets and desserts  Sherbet, gelatin, and fruit ice. Small amounts of dark chocolate.  Limit all sweets and desserts.  Seasonings and condiments  All seasonings and condiments.  The items listed above may not be a complete list of foods and drinks you can eat. Contact a dietitian for more options.  What foods should I avoid?  Fruits  Canned fruit in heavy syrup. Fruit in cream or butter sauce. Fried fruit. Limit coconut.  Vegetables  Vegetables cooked in cheese, cream, or butter sauce. Fried vegetables.  Grains  Breads that are made with saturated or trans fats, oils, or whole milk. Croissants. Sweet rolls. Donuts. High-fat crackers, such as cheese crackers.  Meats and other proteins  Fatty meats, such as hot dogs, ribs, sausage, pedersen, rib-eye roast or steak. High-fat deli meats, such as salami and bologna. Caviar. Domestic duck and goose. Organ meats, such as liver.  Dairy  Cream, sour cream, cream cheese, and creamed cottage cheese. Whole-milk cheeses. Whole or 2% milk that is liquid, evaporated, or condensed. Whole buttermilk. Cream sauce or high-fat cheese sauce. Yogurt that is made from whole milk.  Fats and oils  Meat fat, or shortening. Cocoa butter, hydrogenated oils, palm oil, coconut oil, palm kernel oil. Solid fats and shortenings, including pedersen fat, salt pork, lard, and butter. Nondairy cream substitutes. Salad dressings with cheese or sour cream.  Beverages  Regular sodas and juice drinks with added sugar.  Sweets and desserts  Frosting. Pudding. Cookies. Cakes. Pies. Milk chocolate or white chocolate. Buttered syrups. Full-fat ice cream or ice cream drinks.  The items listed above may not be a complete list of foods and drinks to avoid. Contact a dietitian for more information.  Summary  · Heart-healthy meal planning includes eating less unhealthy fats, eating more healthy fats, and making other changes in your diet.  · Eat a balanced diet. This includes  fruits and vegetables, low-fat or nonfat dairy, lean protein, nuts and legumes, whole grains, and heart-healthy oils and fats.  This information is not intended to replace advice given to you by your health care provider. Make sure you discuss any questions you have with your health care provider.  Document Revised: 02/21/2019 Document Reviewed: 01/25/2019  [employer] Patient Education © 2021 [employer]

## 2022-01-17 NOTE — PROGRESS NOTES
Javi Aquino III, [facility], [facility]  Interventional Cardiology       Reason for visit : Follow up  HPI   Jalen Warren is a 86 y.o. male who presents to the office for cardiovascular follow up.  He was last seen in July.  He continues to play squash 3 times a week.  He denies cardiac complaints of chest pain at rest with exertion, shortness of breath, orthopnea, PND, palpitations or lightheadedness.   He has received 3 doses of the Moderna COVID-19 vaccine, as well as, the flu vaccine.           Problem List:  · Coronary artery disease  · Hyperlipidemia         Current Outpatient Medications   Medication Sig   • aspirin 81 mg enteric coated tablet Take 81 mg by mouth daily.   • cholecalciferol, vitamin D3, (VITAMIN D3) 1,000 unit capsule Take 1,000 Units by mouth daily.   • JUBLIA 10 % solution with applicator Apply 1 application topically daily.   • losartan (COZAAR) 50 mg tablet Take 1 tablet (50 mg total) by mouth daily.   • magnesium chloride (SLOW-MAG) 71.5 mg tablet,delayed release (DR/EC) Take 71.5 mg by mouth daily.   • rosuvastatin (CRESTOR) 10 mg tablet Take 1 tablet (10 mg total) by mouth once daily.          Allergies:  Patient has no known allergies.       Review of Systems  Constitution: Negative for chills, fever, weight gain and weight loss.   HENT: Negative for congestion, hearing loss and nosebleeds.    Eyes: Negative for visual disturbance.   Cardiovascular: Negative for chest pain, dyspnea on exertion, orthopnea, PND, palpitation, presyncope, syncope, claudication.    Respiratory: Negative for cough and shortness of breath.    Hematologic/Lymphatic: Does not bruise/bleed easily.   Skin: Negative for rash.   Musculoskeletal: Negative for muscle weakness and myalgias.  Positive joint stiffness.    Gastrointestinal: Negative for abdominal pain, nausea, vomiting, anorexia, hematemesis, hematochezia, melena.   Genitourinary: Negative for dysuria, frequency and nocturia.   Neurological: Negative  "for dizziness, focal weakness, headaches, light-headedness, numbness and paresthesias.        Objective   Vitals:    01/17/22 0853   BP: 138/80   BP Location: Left upper arm   Patient Position: Sitting   Pulse: 63   Resp: 16   SpO2: 98%   Weight: 74.6 kg (164 lb 6.4 oz)   Height: 1.702 m (5' 7\")   \    Wt Readings from Last 3 Encounters:   01/17/22 74.6 kg (164 lb 6.4 oz)   07/29/21 73.9 kg (163 lb)   01/25/21 75.3 kg (166 lb)     Physical Exam  Constitutional: No acute distress.  Well-developed and well-nourished.  HENT: Normocephalic and atraumatic. Moist mucous membranes.  Eyes: EOM are normal.   Neck: No JVD present.  No carotid bruit.   Cardiovascular: Normal rate and regular rhythm.  No murmur, gallop or rub.  Pulmonary/Chest: Normal effort and air entry. No wheezing, rales, ronchi.  Abdominal: Normal bowel sounds. Soft, non tender.   Musculoskeletal: No bony deformity.  Extremities: No edema.   Neurological: Alert and oriented to person, place, and time.   Skin: Skin is warm and dry. No rash.  Psychiatric: Normal mood, affect and behavior.        Labs:   Lab Results   Component Value Date    WBC 5.4 12/14/2021    HGB 14.8 12/14/2021    HCT 44.2 12/14/2021     12/14/2021    CHOL 143 12/14/2021    TRIG 143 12/14/2021    HDL 64 12/14/2021    LDLCALC 55 12/14/2021    NONHDLCALC 74 02/25/2021    ALT 22 12/14/2021    AST 23 12/14/2021     12/14/2021    K 4.4 12/14/2021     12/14/2021    CREATININE 0.79 12/14/2021    BUN 14 12/14/2021    CO2 25 12/14/2021    HGBA1C 6.0 (H) 12/14/2021       ECG :  Sinus rhythm at 63 bpm.    v       Assessment/Plan   Coronary artery disease.    He has a history of PCI of the posterior lateral branch of the RCA with placement of a drug-eluting stent over 10 years ago.  His last noninvasive assessment was in 2016 which showed no provokable ischemia.  He continues to play squash regularly.  He denies anginal symptoms.  We recommend continue medical management and risk " factor modifications.    Elevated blood pressure.    His blood pressure remains normotensive on his current medication regimen.     Dyslipidemia.    Last LDL was at goal, 55.  He remains on statin therapy, free of myalgias.  Lab slips have been provided for him to have blood work drawn prior to his neck scheduled appointment.  He understands to fast for 12 to 14 hours prior to the blood draw.    Follow up in 6 months.  He is aware to contact the office immediately with any change in symptoms.    I thank you for allowing me to participate in the care of your patient.  If I can furnish you with any further details, please do not hesitate to contact me.    I, Maribell Vergara, am scribing for, and in the presence of, Javi Aquino MD.    I personally performed a history and physical. Maribell Vergara, nurse practitioner, acted as scribe. The information memorialized above is accurate and complete. The patient continues to play doubles squash regularly. He is hemodynamically stable. He is aware of the need for immediate attention if there is any change in his clinical status. He is not really interested in proceeding with any noninvasive testing unless he were to develop symptoms consistent with an acute coronary syndrome    This letter was generated using speech recognition software. Please excuse any typographical errors.

## 2022-01-17 NOTE — LETTER
January 17, 2022     Robert Gallego, DO  [address]  [facility] [address]    Patient: Jalen Warren  YOB: 1935  Date of Visit: 1/17/2022      Dear Dr. Gallego:    Thank you for referring Jalen Warren to me for evaluation. Below are my notes for this consultation.    If you have questions, please do not hesitate to call me. I look forward to following your patient along with you.         Sincerely,        Javi Aquino III, MD        CC: No Recipients  Maribell Vergara CRNP  1/17/2022  9:44 AM  Signed       Javi Aquino III, [facility], [facility]  Interventional Cardiology       Reason for visit : Follow up  HPI   Jalen Warren is a 86 y.o. male who presents to the office for cardiovascular follow up.  He was last seen in July.  He continues to play squash 3 times a week.  He denies cardiac complaints of chest pain at rest with exertion, shortness of breath, orthopnea, PND, palpitations or lightheadedness.   He has received 3 doses of the Moderna COVID-19 vaccine, as well as, the flu vaccine.           Problem List:  · Coronary artery disease  · Hyperlipidemia         Current Outpatient Medications   Medication Sig   • aspirin 81 mg enteric coated tablet Take 81 mg by mouth daily.   • cholecalciferol, vitamin D3, (VITAMIN D3) 1,000 unit capsule Take 1,000 Units by mouth daily.   • JUBLIA 10 % solution with applicator Apply 1 application topically daily.   • losartan (COZAAR) 50 mg tablet Take 1 tablet (50 mg total) by mouth daily.   • magnesium chloride (SLOW-MAG) 71.5 mg tablet,delayed release (DR/EC) Take 71.5 mg by mouth daily.   • rosuvastatin (CRESTOR) 10 mg tablet Take 1 tablet (10 mg total) by mouth once daily.          Allergies:  Patient has no known allergies.       Review of Systems  Constitution: Negative for chills, fever, weight gain and weight loss.   HENT: Negative for congestion, hearing loss and nosebleeds.    Eyes: Negative for visual disturbance.  "  Cardiovascular: Negative for chest pain, dyspnea on exertion, orthopnea, PND, palpitation, presyncope, syncope, claudication.    Respiratory: Negative for cough and shortness of breath.    Hematologic/Lymphatic: Does not bruise/bleed easily.   Skin: Negative for rash.   Musculoskeletal: Negative for muscle weakness and myalgias.  Positive joint stiffness.    Gastrointestinal: Negative for abdominal pain, nausea, vomiting, anorexia, hematemesis, hematochezia, melena.   Genitourinary: Negative for dysuria, frequency and nocturia.   Neurological: Negative for dizziness, focal weakness, headaches, light-headedness, numbness and paresthesias.        Objective   Vitals:    01/17/22 0853   BP: 138/80   BP Location: Left upper arm   Patient Position: Sitting   Pulse: 63   Resp: 16   SpO2: 98%   Weight: 74.6 kg (164 lb 6.4 oz)   Height: 1.702 m (5' 7\")   \    Wt Readings from Last 3 Encounters:   01/17/22 74.6 kg (164 lb 6.4 oz)   07/29/21 73.9 kg (163 lb)   01/25/21 75.3 kg (166 lb)     Physical Exam  Constitutional: No acute distress.  Well-developed and well-nourished.  HENT: Normocephalic and atraumatic. Moist mucous membranes.  Eyes: EOM are normal.   Neck: No JVD present.  No carotid bruit.   Cardiovascular: Normal rate and regular rhythm.  No murmur, gallop or rub.  Pulmonary/Chest: Normal effort and air entry. No wheezing, rales, ronchi.  Abdominal: Normal bowel sounds. Soft, non tender.   Musculoskeletal: No bony deformity.  Extremities: No edema.   Neurological: Alert and oriented to person, place, and time.   Skin: Skin is warm and dry. No rash.  Psychiatric: Normal mood, affect and behavior.        Labs:   Lab Results   Component Value Date    WBC 5.4 12/14/2021    HGB 14.8 12/14/2021    HCT 44.2 12/14/2021     12/14/2021    CHOL 143 12/14/2021    TRIG 143 12/14/2021    HDL 64 12/14/2021    LDLCALC 55 12/14/2021    NONHDLCALC 74 02/25/2021    ALT 22 12/14/2021    AST 23 12/14/2021     12/14/2021    " K 4.4 12/14/2021     12/14/2021    CREATININE 0.79 12/14/2021    BUN 14 12/14/2021    CO2 25 12/14/2021    HGBA1C 6.0 (H) 12/14/2021       ECG :  Sinus rhythm at 63 bpm.    v       Assessment/Plan   Coronary artery disease.    He has a history of PCI of the posterior lateral branch of the RCA with placement of a drug-eluting stent over 10 years ago.  His last noninvasive assessment was in 2016 which showed no provokable ischemia.  He continues to play squash regularly.  He denies anginal symptoms.  We recommend continue medical management and risk factor modifications.    Elevated blood pressure.    His blood pressure remains normotensive on his current medication regimen.     Dyslipidemia.    Last LDL was at goal, 55.  He remains on statin therapy, free of myalgias.  Lab slips have been provided for him to have blood work drawn prior to his neck scheduled appointment.  He understands to fast for 12 to 14 hours prior to the blood draw.    Follow up in 6 months.  He is aware to contact the office immediately with any change in symptoms.    I thank you for allowing me to participate in the care of your patient.  If I can furnish you with any further details, please do not hesitate to contact me.    I, Maribell Vergara, am scribing for, and in the presence of, Javi Aquino MD.    I personally performed a history and physical. Maribell Vergara, nurse practitioner, acted as scribe. The information memorialized above is accurate and complete. The patient continues to play doubles squash regularly. He is hemodynamically stable. He is aware of the need for immediate attention if there is any change in his clinical status. He is not really interested in proceeding with any noninvasive testing unless he were to develop symptoms consistent with an acute coronary syndrome    This letter was generated using speech recognition software. Please excuse any typographical errors.

## 2022-02-10 RX ORDER — ROSUVASTATIN CALCIUM 10 MG/1
TABLET, COATED ORAL
Qty: 90 TABLET | Refills: 3 | Status: SHIPPED | OUTPATIENT
Start: 2022-02-10

## 2022-02-21 RX ORDER — LOSARTAN POTASSIUM 50 MG/1
TABLET ORAL
Qty: 90 TABLET | Refills: 3 | Status: SHIPPED | OUTPATIENT
Start: 2022-02-21 | End: 2025-03-06

## 2022-07-07 ENCOUNTER — LAB REQUISITION (OUTPATIENT)
Dept: LAB | Facility: HOSPITAL | Age: 87
End: 2022-07-07
Attending: INTERNAL MEDICINE
Payer: COMMERCIAL

## 2022-07-07 DIAGNOSIS — R05.9 COUGH, UNSPECIFIED: ICD-10-CM

## 2022-07-07 LAB
FLUAV RNA SPEC QL NAA+PROBE: NEGATIVE
FLUBV RNA SPEC QL NAA+PROBE: NEGATIVE
RSV RNA SPEC QL NAA+PROBE: NEGATIVE
SARS-COV-2 RNA RESP QL NAA+PROBE: NEGATIVE

## 2022-07-07 PROCEDURE — 87637 SARSCOV2&INF A&B&RSV AMP PRB: CPT | Performed by: INTERNAL MEDICINE

## 2022-07-26 ENCOUNTER — OFFICE VISIT (OUTPATIENT)
Dept: CARDIOLOGY | Facility: CLINIC | Age: 87
End: 2022-07-26
Payer: COMMERCIAL

## 2022-07-26 VITALS
RESPIRATION RATE: 18 BRPM | OXYGEN SATURATION: 97 % | HEART RATE: 67 BPM | BODY MASS INDEX: 25.11 KG/M2 | WEIGHT: 160 LBS | HEIGHT: 67 IN

## 2022-07-26 DIAGNOSIS — I25.10 ATHEROSCLEROSIS OF CORONARY ARTERY OF NATIVE HEART WITHOUT ANGINA PECTORIS, UNSPECIFIED VESSEL OR LESION TYPE: Primary | ICD-10-CM

## 2022-07-26 PROCEDURE — 3008F BODY MASS INDEX DOCD: CPT | Performed by: INTERNAL MEDICINE

## 2022-07-26 PROCEDURE — 99214 OFFICE O/P EST MOD 30 MIN: CPT | Performed by: INTERNAL MEDICINE

## 2022-07-26 PROCEDURE — 93000 ELECTROCARDIOGRAM COMPLETE: CPT | Performed by: INTERNAL MEDICINE

## 2022-07-26 ASSESSMENT — ENCOUNTER SYMPTOMS
LIGHT-HEADEDNESS: 1
DECREASED APPETITE: 0
VERTIGO: 0
WEIGHT LOSS: 0
CONSTITUTIONAL NEGATIVE: 1
FEVER: 0
LOSS OF BALANCE: 1
WEIGHT GAIN: 0

## 2022-07-26 NOTE — LETTER
July 26, 2022     Robert Gallego, [address]  [place], [address] [address]    Patient: Jalen Warren  YOB: 1935  Date of Visit: 7/26/2022      Dear Dr. Gallego:    Thank you for referring Jalen Warren to me for evaluation. Below are my notes for this consultation.    If you have questions, please do not hesitate to call me. I look forward to following your patient along with you.         Sincerely,        Javi Aquino III, MD        CC: No Recipients  Javi Aquino III, MD  7/26/2022  2:44 PM  Signed        Javi Aquino III, MD, [facility], [facility]  Interventional Cardiology      [facility]  [facility]  [facility] Level  [address] [place] [address]    TEL  [phone]  Fax:  [phone]  [facility]            Outpatient Cardiology Office Note          History:  Jalen Warren is a 87 y.o. male who presents for cardiac evaluation.  The patient was last seen in January.  He continues to play squash approximately 3 days a week.  He plays tennis on the grass frequently.  He has had no oppressive chest discomfort or marked exertional dyspnea.  The patient does occasionally experience transient lightheadedness.  This has not resulted in his falling.  He does not have true rotational vertigo.  Although he says he is conscious of drinking fluids he admits he could drink more particularly when he is exercising.  The patient reports no palpitations.      Past Medical History:   Diagnosis Date   • Coronary artery disease    • Lipid disorder      No past surgical history on file.  No Known Allergies    Current Outpatient Medications:   •  aspirin 81 mg enteric coated tablet, Take 81 mg by mouth daily., Disp: , Rfl:   •  cholecalciferol, vitamin D3, 25 mcg (1,000 unit) capsule, Take 1,000 Units by mouth daily., Disp: , Rfl:   •  losartan (COZAAR) 50 mg tablet, TAKE 1 TABLET BY MOUTH EVERY DAY, Disp: 90  tablet, Rfl: 3  •  magnesium chloride 71.5 mg tablet,delayed release (DR/EC), Take 71.5 mg by mouth daily., Disp: , Rfl:   •  rosuvastatin (CRESTOR) 10 mg tablet, TAKE 1 TABLET BY MOUTH EVERY DAY, Disp: 90 tablet, Rfl: 3  Family History   Problem Relation Age of Onset   • Cancer Biological Mother    • No Known Problems Biological Father    • Heart disease Biological Brother      Social History     Socioeconomic History   • Marital status:    Tobacco Use   • Smoking status: Never Smoker   • Smokeless tobacco: Never Used   Vaping Use   • Vaping Use: Never used   Substance and Sexual Activity   • Alcohol use: Yes     Comment: social   • Drug use: No   • Sexual activity: Defer         Review of Systems   Constitutional: Negative. Negative for decreased appetite, fever, malaise/fatigue, weight gain and weight loss.   Neurological: Positive for light-headedness and loss of balance. Negative for vertigo.   All other systems reviewed and are negative.       Vitals:    07/26/22 1357   Pulse: 67   Resp: 18   SpO2: 97%       Weight: 72.6 kg (160 lb)     Physical Exam  Vitals reviewed.   Constitutional:       Comments: Well-developed well-nourished male in no acute distress.  Weight 160 pounds, down 4 pounds  Blood pressure 138/80 in the right arm supine sitting and standing no change in pulse.  No symptoms  Skin: Warm dry  HEENT: Normocephalic atraumatic  Neck: No JVD no bruits no adenopathy or thyromegaly  Lungs clear  Cardiac regular rate  Abdomen soft bowel sounds present  Extremities distal pulses are full  Neuro no focal motor or sensory deficits mood and affect are appropriate         Lab Results   Component Value Date    WBC 5.4 12/14/2021    HGB 14.8 12/14/2021     12/14/2021    CHOL 143 12/14/2021    TRIG 143 12/14/2021    HDL 64 12/14/2021    ALT 22 12/14/2021    AST 23 12/14/2021     12/14/2021    K 4.4 12/14/2021    CREATININE 0.79 12/14/2021    HGBA1C 6.0 (H) 12/14/2021      Imaging  Not  applicable    ECG   sinus rhythm, No acute changes    ECHO      1.  Transient lightheadedness.  The patient is not orthostatic today.  On neurologic exam, he is no nystagmus on horizontal or vertical gaze.  His neurologic exam is unremarkable.  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  I have encouraged him to keep himself well-hydrated particularly when exercising.  If his symptoms worsen he will get back to us.  At this point I see no reason for further neurologic testing.    2.  Coronary artery disease.  The patient is status post PCI of the posterolateral branch of the RCA with stenting almost 11 years ago.  Last noninvasive ischemic assessment was in 2016 showing no provokable ischemia.  He is not particularly interested in pursuing noninvasive testing in light of his clinical stability.  He is aware of the need for prompt attention should there be a change in his clinical status.    3.  Hypertension.  Blood pressure is in the high normal range on losartan.  I did not change his medications for fear of aggravating his transient lightheadedness.    4.  Dyslipidemia.  Numbers have been satisfactory.  He will continue his present statin regimen.           Javi Aquino III, MD  7/26/2022

## 2022-07-26 NOTE — PROGRESS NOTES
Javi Aquino III, MD, [facility], [facility]  Interventional Cardiology      [facility]  [facility]  [facility] [address] [address]  [address] [place] [address]    TEL  [phone]  Fax:  [phone]  [facility]            Outpatient Cardiology Office Note          History:  Jalen Warren is a 87 y.o. male who presents for cardiac evaluation.  The patient was last seen in January.  He continues to play squash approximately 3 days a week.  He plays tennis on the grass frequently.  He has had no oppressive chest discomfort or marked exertional dyspnea.  The patient does occasionally experience transient lightheadedness.  This has not resulted in his falling.  He does not have true rotational vertigo.  Although he says he is conscious of drinking fluids he admits he could drink more particularly when he is exercising.  The patient reports no palpitations.      Past Medical History:   Diagnosis Date   • Coronary artery disease    • Lipid disorder      No past surgical history on file.  No Known Allergies    Current Outpatient Medications:   •  aspirin 81 mg enteric coated tablet, Take 81 mg by mouth daily., Disp: , Rfl:   •  cholecalciferol, vitamin D3, 25 mcg (1,000 unit) capsule, Take 1,000 Units by mouth daily., Disp: , Rfl:   •  losartan (COZAAR) 50 mg tablet, TAKE 1 TABLET BY MOUTH EVERY DAY, Disp: 90 tablet, Rfl: 3  •  magnesium chloride 71.5 mg tablet,delayed release (DR/EC), Take 71.5 mg by mouth daily., Disp: , Rfl:   •  rosuvastatin (CRESTOR) 10 mg tablet, TAKE 1 TABLET BY MOUTH EVERY DAY, Disp: 90 tablet, Rfl: 3  Family History   Problem Relation Age of Onset   • Cancer Biological Mother    • No Known Problems Biological Father    • Heart disease Biological Brother      Social History     Socioeconomic History   • Marital status:    Tobacco Use   • Smoking status: Never Smoker   • Smokeless tobacco: Never Used   Vaping Use   • Vaping Use:  Never used   Substance and Sexual Activity   • Alcohol use: Yes     Comment: social   • Drug use: No   • Sexual activity: Defer         Review of Systems   Constitutional: Negative. Negative for decreased appetite, fever, malaise/fatigue, weight gain and weight loss.   Neurological: Positive for light-headedness and loss of balance. Negative for vertigo.   All other systems reviewed and are negative.       Vitals:    07/26/22 1357   Pulse: 67   Resp: 18   SpO2: 97%       Weight: 72.6 kg (160 lb)     Physical Exam  Vitals reviewed.   Constitutional:       Comments: Well-developed well-nourished male in no acute distress.  Weight 160 pounds, down 4 pounds  Blood pressure 138/80 in the right arm supine sitting and standing no change in pulse.  No symptoms  Skin: Warm dry  HEENT: Normocephalic atraumatic  Neck: No JVD no bruits no adenopathy or thyromegaly  Lungs clear  Cardiac regular rate  Abdomen soft bowel sounds present  Extremities distal pulses are full  Neuro no focal motor or sensory deficits mood and affect are appropriate         Lab Results   Component Value Date    WBC 5.4 12/14/2021    HGB 14.8 12/14/2021     12/14/2021    CHOL 143 12/14/2021    TRIG 143 12/14/2021    HDL 64 12/14/2021    ALT 22 12/14/2021    AST 23 12/14/2021     12/14/2021    K 4.4 12/14/2021    CREATININE 0.79 12/14/2021    HGBA1C 6.0 (H) 12/14/2021      Imaging  Not applicable    ECG   sinus rhythm, No acute changes    ECHO      1.  Transient lightheadedness.  The patient is not orthostatic today.  On neurologic exam, he is no nystagmus on horizontal or vertical gaze.  His neurologic exam is unremarkable.  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  I have encouraged him to keep himself well-hydrated particularly when exercising.  If his symptoms worsen he will get back to us.  At this point I see no reason for further neurologic testing.    2.  Coronary artery disease.  The patient is status post PCI of  the posterolateral branch of the RCA with stenting almost 11 years ago.  Last noninvasive ischemic assessment was in 2016 showing no provokable ischemia.  He is not particularly interested in pursuing noninvasive testing in light of his clinical stability.  He is aware of the need for prompt attention should there be a change in his clinical status.    3.  Hypertension.  Blood pressure is in the high normal range on losartan.  I did not change his medications for fear of aggravating his transient lightheadedness.    4.  Dyslipidemia.  Numbers have been satisfactory.  He will continue his present statin regimen.           Javi Aquino III, MD  7/26/2022

## 2023-01-18 PROCEDURE — 80053 COMPREHEN METABOLIC PANEL: CPT | Performed by: INTERNAL MEDICINE

## 2023-01-18 PROCEDURE — 80061 LIPID PANEL: CPT | Performed by: INTERNAL MEDICINE

## 2023-01-18 PROCEDURE — 84153 ASSAY OF PSA TOTAL: CPT | Performed by: INTERNAL MEDICINE

## 2023-01-19 ENCOUNTER — LAB REQUISITION (OUTPATIENT)
Dept: LAB | Facility: HOSPITAL | Age: 88
End: 2023-01-19
Attending: INTERNAL MEDICINE
Payer: COMMERCIAL

## 2023-01-19 DIAGNOSIS — C61 MALIGNANT NEOPLASM OF PROSTATE (CMS/HCC): ICD-10-CM

## 2023-01-19 DIAGNOSIS — I25.10 ATHEROSCLEROTIC HEART DISEASE OF NATIVE CORONARY ARTERY WITHOUT ANGINA PECTORIS: ICD-10-CM

## 2023-01-19 LAB
ALBUMIN SERPL-MCNC: 4.1 G/DL (ref 3.4–5)
ALP SERPL-CCNC: 31 IU/L (ref 35–126)
ALT SERPL-CCNC: 23 IU/L (ref 16–63)
ANION GAP SERPL CALC-SCNC: 8 MEQ/L (ref 3–15)
AST SERPL-CCNC: 23 IU/L (ref 15–41)
BILIRUB DIRECT SERPL-MCNC: 0.2 MG/DL
BILIRUB SERPL-MCNC: 1 MG/DL (ref 0.3–1.2)
BUN SERPL-MCNC: 11 MG/DL (ref 8–20)
CALCIUM SERPL-MCNC: 9.3 MG/DL (ref 8.9–10.3)
CHLORIDE SERPL-SCNC: 102 MEQ/L (ref 98–109)
CHOLEST SERPL-MCNC: 153 MG/DL
CO2 SERPL-SCNC: 27 MEQ/L (ref 22–32)
CREAT SERPL-MCNC: 0.8 MG/DL (ref 0.8–1.3)
GFR SERPL CREATININE-BSD FRML MDRD: >60 ML/MIN/1.73M*2
GLUCOSE SERPL-MCNC: 96 MG/DL (ref 70–99)
HDLC SERPL-MCNC: 78 MG/DL
HDLC SERPL: 2 {RATIO}
LDLC SERPL CALC-MCNC: 63 MG/DL
NONHDLC SERPL-MCNC: 75 MG/DL
POTASSIUM SERPL-SCNC: 4.3 MEQ/L (ref 3.6–5.1)
PROT SERPL-MCNC: 5.8 G/DL (ref 6–8.2)
PSA SERPL-MCNC: 0.38 NG/ML
SODIUM SERPL-SCNC: 137 MEQ/L (ref 136–144)
TRIGL SERPL-MCNC: 62 MG/DL (ref 30–149)

## 2023-02-03 ENCOUNTER — OFFICE VISIT (OUTPATIENT)
Dept: CARDIOLOGY | Facility: CLINIC | Age: 88
End: 2023-02-03
Payer: COMMERCIAL

## 2023-02-03 ENCOUNTER — TELEPHONE (OUTPATIENT)
Dept: CARDIOLOGY | Facility: CLINIC | Age: 88
End: 2023-02-03

## 2023-02-03 VITALS
HEIGHT: 67 IN | RESPIRATION RATE: 18 BRPM | WEIGHT: 164.6 LBS | OXYGEN SATURATION: 96 % | HEART RATE: 81 BPM | BODY MASS INDEX: 25.83 KG/M2

## 2023-02-03 DIAGNOSIS — I25.10 ATHEROSCLEROSIS OF CORONARY ARTERY OF NATIVE HEART WITHOUT ANGINA PECTORIS, UNSPECIFIED VESSEL OR LESION TYPE: Primary | ICD-10-CM

## 2023-02-03 PROCEDURE — 93000 ELECTROCARDIOGRAM COMPLETE: CPT | Performed by: INTERNAL MEDICINE

## 2023-02-03 PROCEDURE — 99214 OFFICE O/P EST MOD 30 MIN: CPT | Performed by: INTERNAL MEDICINE

## 2023-02-03 PROCEDURE — 3008F BODY MASS INDEX DOCD: CPT | Performed by: INTERNAL MEDICINE

## 2023-02-03 RX ORDER — GENTAMICIN SULFATE 1 MG/G
OINTMENT TOPICAL
COMMUNITY
Start: 2022-11-03

## 2023-02-03 RX ORDER — SILDENAFIL 25 MG/1
25 TABLET, FILM COATED ORAL DAILY PRN
Qty: 10 TABLET | Refills: 1 | Status: SHIPPED | OUTPATIENT
Start: 2023-02-03 | End: 2024-02-12 | Stop reason: SDUPTHER

## 2023-02-03 NOTE — TELEPHONE ENCOUNTER
The medication you have requested is not covered by Medicare Part D Law. If you believe the medication is being used for a medically accepted or compendia supported indication approved by CMS, please contact your patient's plan.  Drug  Sildenafil Citrate 25MG tablets

## 2023-02-03 NOTE — PROGRESS NOTES
"     Javi Aquino III, MD,FAC, [facility]  Interventional Cardiology       Reason for visit : Follow up  HPI   Jalen Warren is a 87 y.o. male who presents to the office for cardiovascular follow up.  He was last seen in July.  He continues to play squash on Tuesdays and Fridays.  Continues to have occasional lightheadedness.  Does note over the last several weeks chest \"twinges\" under the left breast, typically at rest.  Lasts 2 to 3 seconds.  Has not felt any of these episodes over the last several days.  Denies cardiac complaints of chest pain at rest with exertion, shortness of breath, orthopnea, PND, palpitations or lightheadedness.         Problem List:  · Coronary artery disease  · Dyslipidemia  · Transient lightheadedness         Current Outpatient Medications   Medication Sig   • aspirin 81 mg enteric coated tablet Take 81 mg by mouth daily.   • cholecalciferol, vitamin D3, 25 mcg (1,000 unit) capsule Take 1,000 Units by mouth daily.   • gentamicin (GARAMYCIN) 0.1 % ointment APPLY LIBERALLY TO AFFECTED AREA TWICE DAILY AS DIRECTED.   • losartan (COZAAR) 50 mg tablet TAKE 1 TABLET BY MOUTH EVERY DAY   • magnesium chloride 71.5 mg tablet,delayed release (DR/EC) Take 71.5 mg by mouth daily.   • rosuvastatin (CRESTOR) 10 mg tablet TAKE 1 TABLET BY MOUTH EVERY DAY   • sildenafiL (VIAGRA) 25 mg tablet Take 1 tablet (25 mg total) by mouth daily as needed for erectile dysfunction.          Allergies:  Patient has no known allergies.       Review of Systems  Constitution: Negative for chills, fever, positive weight gain.  Appetite is good.  Sleeps well.  HENT: Negative for congestion, hearing loss and nosebleeds.    Eyes: Negative for visual disturbance.   Cardiovascular: Negative for chest pain, dyspnea on exertion, orthopnea, PND, palpitation, presyncope, syncope, claudication.    Respiratory: Negative for cough and shortness of breath.    Hematologic/Lymphatic: Does not bruise/bleed easily.   Skin: Negative " "for rash.   Musculoskeletal: Negative for muscle weakness and myalgias.  Positive joint stiffness.  Gastrointestinal: Negative for abdominal pain, nausea, vomiting, anorexia, hematemesis, hematochezia, melena.   Genitourinary: Negative for dysuria, frequency and nocturia.   Neurological: Negative for dizziness, focal weakness, headaches, occasional light-headedness, no numbness and paresthesias.        Objective   Vitals:    02/03/23 1403   Pulse: 81   Resp: 18   SpO2: 96%   Weight: 74.7 kg (164 lb 9.6 oz)   Height: 1.702 m (5' 7\")   /78, negative tilt    Wt Readings from Last 3 Encounters:   02/03/23 74.7 kg (164 lb 9.6 oz)   07/26/22 72.6 kg (160 lb)   01/17/22 74.6 kg (164 lb 6.4 oz)     Physical Exam  Constitutional: No acute distress.  Well-developed well-nourished.  HENT: Normocephalic and atraumatic. Moist mucous membranes.  Eyes: EOM are normal.   Neck: No JVD present.  No carotid bruit.  Cardiovascular: Normal rate and regular rhythm.  Positive systolic murmur at the right sternal border.  Pulmonary/Chest: Normal effort and air entry. No wheezing, rales, ronchi.  Abdominal: Normal bowel sounds. Soft, non tender.   Musculoskeletal: No bony deformity.  Extremities: No edema.   Neurological: Alert and oriented to person, place, and time.   Skin: Skin is warm and dry. No rash.  Psychiatric: Normal mood, affect and behavior.        Labs:   Lab Results   Component Value Date    WBC 5.4 12/14/2021    HGB 14.8 12/14/2021    HCT 44.2 12/14/2021     12/14/2021    CHOL 153 01/18/2023    TRIG 62 01/18/2023    HDL 78 01/18/2023    LDLCALC 63 01/18/2023    NONHDLCALC 75 01/18/2023    ALT 23 01/18/2023    AST 23 01/18/2023     01/18/2023    K 4.3 01/18/2023     01/18/2023    CREATININE 0.8 01/18/2023    BUN 11 01/18/2023    CO2 27 01/18/2023    HGBA1C 6.0 (H) 12/14/2021       ECG :  Sinus rhythm at 81 bpm         Assessment/Plan   Transient lightheadedness.    He continues to complain of " occasional lightheadedness.  He is a negative tilt here in the office.  His neurologic exam remains unremarkable.  He stays well-hydrated, particularly while playing squash.  No further testing at this time.    Coronary artery disease.   12 years ago he had PCI of the posterior lateral branch of the RCA with stenting.  His last noninvasive ischemic assessment was in 2016 showing no provokable ischemia.  He continues to be active without cardiac complaints he does not wish to pursue further testing at this time.  He remains free of anginal symptoms.  We recommend continue medical management risk factor modifications.    Hypertension.   His blood pressure remains high normal.  We have not made any medication changes as we do not want to exacerbate any lightheadedness.    Dyslipidemia.   His LDL remains at goal on low-dose rosuvastatin.  He remains free of myalgias related to his statin therapy.  He is questioning whether the lightheadedness is anything to do with  rosuvastatin.  We assured him it was not the cause of his lightheadedness.  Once again he has been encouraged to monitor his fluid intake.  As well, change positions slowly.    Emotional distress  He is under a great deal of stress with his son, they are currently estranged.  Follow up in 6 months.  He is aware to contact the office immediately with any change in symptoms.    I thank you for allowing me to participate in the care of your patient.  If I can furnish you with any further details, please do not hesitate to contact me.    I, Maribell Vergara, am scribing for, and in the presence of, Javi Aquino MD.    I personally performed a history and physical.  Maribell Vergara, nurse practitioner, acted as scribe.  Patient is stable from a cardiovascular standpoint.  He will continue his present medical regimen.  He is aware of the need for prompt attention if there is any change in his cardiac status.    This letter was generated using speech  recognition software. Please excuse any typographical errors.

## 2023-02-03 NOTE — LETTER
"February 3, 2023     Robert Gallego, DO  [address]  [facility] [place] [address]    Patient: Jalen Warren  YOB: 1935  Date of Visit: 2/3/2023      Dear Dr. Gallego:    Thank you for referring Jalen Warren to me for evaluation. Below are my notes for this consultation.    If you have questions, please do not hesitate to call me. I look forward to following your patient along with you.         Sincerely,        Javi Aquino III, MD        CC: No Recipients  Maribell Vergara CRNP  2/3/2023  3:18 PM  Signed       Javi Aquino III, MD,[facility], [facility]  Interventional Cardiology       Reason for visit : Follow up  HPI   Jalen Warren is a 87 y.o. male who presents to the office for cardiovascular follow up.  He was last seen in July.  He continues to play squash on Tuesdays and Fridays.  Continues to have occasional lightheadedness.  Does note over the last several weeks chest \"twinges\" under the left breast, typically at rest.  Lasts 2 to 3 seconds.  Has not felt any of these episodes over the last several days.  Denies cardiac complaints of chest pain at rest with exertion, shortness of breath, orthopnea, PND, palpitations or lightheadedness.         Problem List:  · Coronary artery disease  · Dyslipidemia  · Transient lightheadedness         Current Outpatient Medications   Medication Sig   • aspirin 81 mg enteric coated tablet Take 81 mg by mouth daily.   • cholecalciferol, vitamin D3, 25 mcg (1,000 unit) capsule Take 1,000 Units by mouth daily.   • gentamicin (GARAMYCIN) 0.1 % ointment APPLY LIBERALLY TO AFFECTED AREA TWICE DAILY AS DIRECTED.   • losartan (COZAAR) 50 mg tablet TAKE 1 TABLET BY MOUTH EVERY DAY   • magnesium chloride 71.5 mg tablet,delayed release (DR/EC) Take 71.5 mg by mouth daily.   • rosuvastatin (CRESTOR) 10 mg tablet TAKE 1 TABLET BY MOUTH EVERY DAY   • sildenafiL (VIAGRA) 25 mg tablet Take 1 tablet (25 mg total) by mouth daily as needed for " "erectile dysfunction.          Allergies:  Patient has no known allergies.       Review of Systems  Constitution: Negative for chills, fever, positive weight gain.  Appetite is good.  Sleeps well.  HENT: Negative for congestion, hearing loss and nosebleeds.    Eyes: Negative for visual disturbance.   Cardiovascular: Negative for chest pain, dyspnea on exertion, orthopnea, PND, palpitation, presyncope, syncope, claudication.    Respiratory: Negative for cough and shortness of breath.    Hematologic/Lymphatic: Does not bruise/bleed easily.   Skin: Negative for rash.   Musculoskeletal: Negative for muscle weakness and myalgias.  Positive joint stiffness.  Gastrointestinal: Negative for abdominal pain, nausea, vomiting, anorexia, hematemesis, hematochezia, melena.   Genitourinary: Negative for dysuria, frequency and nocturia.   Neurological: Negative for dizziness, focal weakness, headaches, occasional light-headedness, no numbness and paresthesias.        Objective   Vitals:    02/03/23 1403   Pulse: 81   Resp: 18   SpO2: 96%   Weight: 74.7 kg (164 lb 9.6 oz)   Height: 1.702 m (5' 7\")   /78, negative tilt    Wt Readings from Last 3 Encounters:   02/03/23 74.7 kg (164 lb 9.6 oz)   07/26/22 72.6 kg (160 lb)   01/17/22 74.6 kg (164 lb 6.4 oz)     Physical Exam  Constitutional: No acute distress.  Well-developed well-nourished.  HENT: Normocephalic and atraumatic. Moist mucous membranes.  Eyes: EOM are normal.   Neck: No JVD present.  No carotid bruit.  Cardiovascular: Normal rate and regular rhythm.  Positive systolic murmur at the right sternal border.  Pulmonary/Chest: Normal effort and air entry. No wheezing, rales, ronchi.  Abdominal: Normal bowel sounds. Soft, non tender.   Musculoskeletal: No bony deformity.  Extremities: No edema.   Neurological: Alert and oriented to person, place, and time.   Skin: Skin is warm and dry. No rash.  Psychiatric: Normal mood, affect and behavior.        Labs:   Lab Results "   Component Value Date    WBC 5.4 12/14/2021    HGB 14.8 12/14/2021    HCT 44.2 12/14/2021     12/14/2021    CHOL 153 01/18/2023    TRIG 62 01/18/2023    HDL 78 01/18/2023    LDLCALC 63 01/18/2023    NONHDLCALC 75 01/18/2023    ALT 23 01/18/2023    AST 23 01/18/2023     01/18/2023    K 4.3 01/18/2023     01/18/2023    CREATININE 0.8 01/18/2023    BUN 11 01/18/2023    CO2 27 01/18/2023    HGBA1C 6.0 (H) 12/14/2021       ECG :  Sinus rhythm at 81 bpm         Assessment/Plan   Transient lightheadedness.    He continues to complain of occasional lightheadedness.  He is a negative tilt here in the office.  His neurologic exam remains unremarkable.  He stays well-hydrated, particularly while playing squash.  No further testing at this time.    Coronary artery disease.   12 years ago he had PCI of the posterior lateral branch of the RCA with stenting.  His last noninvasive ischemic assessment was in 2016 showing no provokable ischemia.  He continues to be active without cardiac complaints he does not wish to pursue further testing at this time.  He remains free of anginal symptoms.  We recommend continue medical management risk factor modifications.    Hypertension.   His blood pressure remains high normal.  We have not made any medication changes as we do not want to exacerbate any lightheadedness.    Dyslipidemia.   His LDL remains at goal on low-dose rosuvastatin.  He remains free of myalgias related to his statin therapy.  He is questioning whether the lightheadedness is anything to do with  rosuvastatin.  We assured him it was not the cause of his lightheadedness.  Once again he has been encouraged to monitor his fluid intake.  As well, change positions slowly.    Emotional distress  He is under a great deal of stress with his son, they are currently estranged.  Follow up in 6 months.  He is aware to contact the office immediately with any change in symptoms.    I thank you for allowing me to  participate in the care of your patient.  If I can furnish you with any further details, please do not hesitate to contact me.    I, Maribell Vergara, am scribing for, and in the presence of, Javi Aquino MD.    I personally performed a history and physical.  Maribell Vergara, nurse practitioner, acted as scribe.  Patient is stable from a cardiovascular standpoint.  He will continue his present medical regimen.  He is aware of the need for prompt attention if there is any change in his cardiac status.    This letter was generated using speech recognition software. Please excuse any typographical errors.

## 2023-08-10 ENCOUNTER — OFFICE VISIT (OUTPATIENT)
Dept: CARDIOLOGY | Facility: CLINIC | Age: 88
End: 2023-08-10
Payer: COMMERCIAL

## 2023-08-10 VITALS — HEIGHT: 67 IN | HEART RATE: 65 BPM | BODY MASS INDEX: 25.11 KG/M2 | WEIGHT: 160 LBS | OXYGEN SATURATION: 95 %

## 2023-08-10 DIAGNOSIS — I25.10 ATHEROSCLEROSIS OF CORONARY ARTERY OF NATIVE HEART WITHOUT ANGINA PECTORIS, UNSPECIFIED VESSEL OR LESION TYPE: Primary | ICD-10-CM

## 2023-08-10 PROCEDURE — 93000 ELECTROCARDIOGRAM COMPLETE: CPT | Performed by: INTERNAL MEDICINE

## 2023-08-10 PROCEDURE — 3008F BODY MASS INDEX DOCD: CPT | Performed by: INTERNAL MEDICINE

## 2023-08-10 PROCEDURE — 99214 OFFICE O/P EST MOD 30 MIN: CPT | Performed by: INTERNAL MEDICINE

## 2023-08-10 ASSESSMENT — ENCOUNTER SYMPTOMS
WEIGHT LOSS: 0
DYSPNEA ON EXERTION: 0
WEIGHT GAIN: 0
LIGHT-HEADEDNESS: 1

## 2023-08-10 NOTE — PROGRESS NOTES
Javi Aquino III, MD, [facility], [facility]  Interventional Cardiology      [facility]  [facility]  [facility] [address] [facility]  [address] [place] [address]    TEL  [phone]  Fax:  [phone]  [facility]            Outpatient Cardiology Office Note          History:  Jalen Warren is a 88 y.o. male who presents for the patient continues to play squash twice a week.  He has had rare episodes of lightheadedness with nothing to suggest crescendo angina.  He remains somewhat concerned because 2 younger brothers both had CABG within the day of each other in April.  Fortunately, both are doing well      Past Medical History:   Diagnosis Date   • Coronary artery disease    • Lipid disorder      History reviewed. No pertinent surgical history.  No Known Allergies    Current Outpatient Medications:   •  aspirin 81 mg enteric coated tablet, Take 81 mg by mouth daily., Disp: , Rfl:   •  cholecalciferol, vitamin D3, 25 mcg (1,000 unit) capsule, Take 1,000 Units by mouth daily., Disp: , Rfl:   •  losartan (COZAAR) 50 mg tablet, TAKE 1 TABLET BY MOUTH EVERY DAY, Disp: 90 tablet, Rfl: 3  •  magnesium chloride 71.5 mg tablet,delayed release (DR/EC), Take 71.5 mg by mouth daily., Disp: , Rfl:   •  rosuvastatin (CRESTOR) 10 mg tablet, TAKE 1 TABLET BY MOUTH EVERY DAY, Disp: 90 tablet, Rfl: 3  •  sildenafiL (VIAGRA) 25 mg tablet, Take 1 tablet (25 mg total) by mouth daily as needed for erectile dysfunction., Disp: 10 tablet, Rfl: 1  •  gentamicin (GARAMYCIN) 0.1 % ointment, APPLY LIBERALLY TO AFFECTED AREA TWICE DAILY AS DIRECTED., Disp: , Rfl:   Family History   Problem Relation Age of Onset   • Cancer Biological Mother    • No Known Problems Biological Father    • Heart disease Biological Brother      Social History     Socioeconomic History   • Marital status:      Spouse name: None   • Number of children: None   • Years of education: None   • Highest  education level: None   Tobacco Use   • Smoking status: Never   • Smokeless tobacco: Never   Vaping Use   • Vaping Use: Never used   Substance and Sexual Activity   • Alcohol use: Yes     Comment: social   • Drug use: No   • Sexual activity: Defer         Review of Systems   Constitutional: Negative for weight gain and weight loss.   Cardiovascular: Negative for chest pain and dyspnea on exertion.   Neurological: Positive for light-headedness.   All other systems reviewed and are negative.       Vitals:    08/10/23 0952   Pulse: 65   SpO2: 95%       Weight: 72.6 kg (160 lb)     Physical Exam  Vitals reviewed.   Constitutional:       Comments: Well-developed well-nourished male  Weight 160 pounds down approximately 4 pounds  Blood pressure 130/70 tilt negative warm dry  HEENT: Normocephalic atraumatic  Neck: No JVD or bruits  Lungs clear  Cardiac regular rate  Abdomen soft bowel sounds present  Extremities unremarkable         Lab Results   Component Value Date    WBC 5.4 12/14/2021    HGB 14.8 12/14/2021     12/14/2021    CHOL 153 01/18/2023    TRIG 62 01/18/2023    HDL 78 01/18/2023    ALT 23 01/18/2023    AST 23 01/18/2023     01/18/2023    K 4.3 01/18/2023    CREATININE 0.8 01/18/2023    HGBA1C 6.0 (H) 12/14/2021      Imaging  Not applicable    ECG   sinus rhythm    ECHO      1.  History of transient lightheadedness this is not been a major issue.  He does not have orthostatic hypotension.  Advised to keep himself well-hydrated particularly when exercising.    2.  Coronary artery disease.  Status post PCI of the posterolateral branch of the RCA over 12-1/2 years ago.  I know you have ordered a stress echo.  The patient's not sure he wishes to follow through with this.  He will think it over and get back to    3.  Hypertension pressures well-controlled.  He will continue his present regimen    4.  Dyslipidemia.  He will continue his present statin regimen.         Javi Aquino III, MD  8/10/2023

## 2023-08-10 NOTE — LETTER
August 10, 2023     Robert Gallego, DO  [address] [place] [address] Ave  [place], [address] [address]    Patient: Jalen Warren  YOB: 1935  Date of Visit: 8/10/2023      Dear Dr. Gallego:    Thank you for referring Jalen Warren to me for evaluation. Below are my notes for this consultation.    If you have questions, please do not hesitate to call me. I look forward to following your patient along with you.         Sincerely,        Javi Aquino III, MD        CC: No Recipients    Javi Aquino III, MD  8/10/2023 10:34 AM  Signed        Javi Aquino III, MD, [facility]  Interventional Cardiology      [facility]  [facility]  [facility] Level  [address] [place] [address]    TEL  [phone]  Fax:  [phone]  [facility]            Outpatient Cardiology Office Note          History:  Jalen Warren is a 88 y.o. male who presents for the patient continues to play squash twice a week.  He has had rare episodes of lightheadedness with nothing to suggest crescendo angina.  He remains somewhat concerned because 2 younger brothers both had CABG within the day of each other in April.  Fortunately, both are doing well      Past Medical History:   Diagnosis Date   • Coronary artery disease    • Lipid disorder      History reviewed. No pertinent surgical history.  No Known Allergies    Current Outpatient Medications:   •  aspirin 81 mg enteric coated tablet, Take 81 mg by mouth daily., Disp: , Rfl:   •  cholecalciferol, vitamin D3, 25 mcg (1,000 unit) capsule, Take 1,000 Units by mouth daily., Disp: , Rfl:   •  losartan (COZAAR) 50 mg tablet, TAKE 1 TABLET BY MOUTH EVERY DAY, Disp: 90 tablet, Rfl: 3  •  magnesium chloride 71.5 mg tablet,delayed release (DR/EC), Take 71.5 mg by mouth daily., Disp: , Rfl:   •  rosuvastatin (CRESTOR) 10 mg tablet, TAKE 1 TABLET BY MOUTH EVERY DAY, Disp: 90 tablet, Rfl: 3  •  sildenafiL  (VIAGRA) 25 mg tablet, Take 1 tablet (25 mg total) by mouth daily as needed for erectile dysfunction., Disp: 10 tablet, Rfl: 1  •  gentamicin (GARAMYCIN) 0.1 % ointment, APPLY LIBERALLY TO AFFECTED AREA TWICE DAILY AS DIRECTED., Disp: , Rfl:   Family History   Problem Relation Age of Onset   • Cancer Biological Mother    • No Known Problems Biological Father    • Heart disease Biological Brother      Social History     Socioeconomic History   • Marital status:      Spouse name: None   • Number of children: None   • Years of education: None   • Highest education level: None   Tobacco Use   • Smoking status: Never   • Smokeless tobacco: Never   Vaping Use   • Vaping Use: Never used   Substance and Sexual Activity   • Alcohol use: Yes     Comment: social   • Drug use: No   • Sexual activity: Defer         Review of Systems   Constitutional: Negative for weight gain and weight loss.   Cardiovascular: Negative for chest pain and dyspnea on exertion.   Neurological: Positive for light-headedness.   All other systems reviewed and are negative.       Vitals:    08/10/23 0952   Pulse: 65   SpO2: 95%       Weight: 72.6 kg (160 lb)     Physical Exam  Vitals reviewed.   Constitutional:       Comments: Well-developed well-nourished male  Weight 160 pounds down approximately 4 pounds  Blood pressure 130/70 tilt negative warm dry  HEENT: Normocephalic atraumatic  Neck: No JVD or bruits  Lungs clear  Cardiac regular rate  Abdomen soft bowel sounds present  Extremities unremarkable         Lab Results   Component Value Date    WBC 5.4 12/14/2021    HGB 14.8 12/14/2021     12/14/2021    CHOL 153 01/18/2023    TRIG 62 01/18/2023    HDL 78 01/18/2023    ALT 23 01/18/2023    AST 23 01/18/2023     01/18/2023    K 4.3 01/18/2023    CREATININE 0.8 01/18/2023    HGBA1C 6.0 (H) 12/14/2021      Imaging  Not applicable    ECG   sinus rhythm    ECHO      1.  History of transient lightheadedness this is not been a major  issue.  He does not have orthostatic hypotension.  Advised to keep himself well-hydrated particularly when exercising.    2.  Coronary artery disease.  Status post PCI of the posterolateral branch of the RCA over 12-1/2 years ago.  I know you have ordered a stress echo.  The patient's not sure he wishes to follow through with this.  He will think it over and get back to    3.  Hypertension pressures well-controlled.  He will continue his present regimen    4.  Dyslipidemia.  He will continue his present statin regimen.         Javi Aquino III, MD  8/10/2023

## 2024-01-09 ENCOUNTER — LAB REQUISITION (OUTPATIENT)
Dept: LAB | Facility: HOSPITAL | Age: 89
End: 2024-01-09
Attending: INTERNAL MEDICINE
Payer: COMMERCIAL

## 2024-01-09 DIAGNOSIS — E55.9 VITAMIN D DEFICIENCY, UNSPECIFIED: ICD-10-CM

## 2024-01-09 LAB
25(OH)D3 SERPL-MCNC: 41 NG/ML (ref 30–100)
ALBUMIN SERPL-MCNC: 4.2 G/DL (ref 3.5–5.7)
ALP SERPL-CCNC: 32 IU/L (ref 34–125)
ALT SERPL-CCNC: 27 IU/L (ref 7–52)
ANION GAP SERPL CALC-SCNC: 10 MEQ/L (ref 3–15)
AST SERPL-CCNC: 19 IU/L (ref 13–39)
BASOPHILS # BLD: 0.01 K/UL (ref 0.01–0.1)
BASOPHILS NFR BLD: 0.2 %
BILIRUB SERPL-MCNC: 0.6 MG/DL (ref 0.3–1.2)
BUN SERPL-MCNC: 18 MG/DL (ref 7–25)
CALCIUM SERPL-MCNC: 9.4 MG/DL (ref 8.6–10.3)
CHLORIDE SERPL-SCNC: 102 MEQ/L (ref 98–107)
CHOLEST SERPL-MCNC: 154 MG/DL
CO2 SERPL-SCNC: 28 MEQ/L (ref 21–31)
CREAT SERPL-MCNC: 0.8 MG/DL (ref 0.7–1.3)
DIFFERENTIAL METHOD BLD: ABNORMAL
EGFRCR SERPLBLD CKD-EPI 2021: >60 ML/MIN/1.73M*2
EOSINOPHIL # BLD: 0.07 K/UL (ref 0.04–0.54)
EOSINOPHIL NFR BLD: 1.3 %
ERYTHROCYTE [DISTWIDTH] IN BLOOD BY AUTOMATED COUNT: 13.1 % (ref 11.6–14.4)
EST. AVERAGE GLUCOSE BLD GHB EST-MCNC: 126 MG/DL
GLUCOSE SERPL-MCNC: 103 MG/DL (ref 70–99)
HBA1C MFR BLD: 6 %
HCT VFR BLDCO AUTO: 45.3 % (ref 40.1–51)
HDLC SERPL-MCNC: 55 MG/DL
HDLC SERPL: 2.8 {RATIO}
HGB BLD-MCNC: 14.3 G/DL (ref 13.7–17.5)
IMM GRANULOCYTES # BLD AUTO: 0.02 K/UL (ref 0–0.08)
IMM GRANULOCYTES NFR BLD AUTO: 0.4 %
LDLC SERPL CALC-MCNC: 71 MG/DL
LYMPHOCYTES # BLD: 1.32 K/UL (ref 1.2–3.5)
LYMPHOCYTES NFR BLD: 23.8 %
MAGNESIUM SERPL-MCNC: 2.1 MG/DL (ref 1.8–2.5)
MCH RBC QN AUTO: 30 PG (ref 28–33.2)
MCHC RBC AUTO-ENTMCNC: 31.6 G/DL (ref 32.2–36.5)
MCV RBC AUTO: 95 FL (ref 83–98)
MONOCYTES # BLD: 0.53 K/UL (ref 0.3–1)
MONOCYTES NFR BLD: 9.6 %
NEUTROPHILS # BLD: 3.59 K/UL (ref 1.7–7)
NEUTS SEG NFR BLD: 64.7 %
NONHDLC SERPL-MCNC: 99 MG/DL
NRBC BLD-RTO: 0 %
PDW BLD AUTO: 11 FL (ref 9.4–12.4)
PLATELET # BLD AUTO: 196 K/UL (ref 150–350)
POTASSIUM SERPL-SCNC: 4.2 MEQ/L (ref 3.5–5.1)
PROT SERPL-MCNC: 6.6 G/DL (ref 6–8.2)
RBC # BLD AUTO: 4.77 M/UL (ref 4.5–5.8)
SODIUM SERPL-SCNC: 140 MEQ/L (ref 136–145)
TRIGL SERPL-MCNC: 142 MG/DL
TSH SERPL DL<=0.05 MIU/L-ACNC: 1.92 MIU/L (ref 0.34–5.6)
WBC # BLD AUTO: 5.54 K/UL (ref 3.8–10.5)

## 2024-01-09 PROCEDURE — 83036 HEMOGLOBIN GLYCOSYLATED A1C: CPT | Performed by: INTERNAL MEDICINE

## 2024-01-09 PROCEDURE — 84443 ASSAY THYROID STIM HORMONE: CPT | Performed by: INTERNAL MEDICINE

## 2024-01-09 PROCEDURE — 85025 COMPLETE CBC W/AUTO DIFF WBC: CPT | Performed by: INTERNAL MEDICINE

## 2024-01-09 PROCEDURE — 82306 VITAMIN D 25 HYDROXY: CPT | Performed by: INTERNAL MEDICINE

## 2024-01-09 PROCEDURE — 83735 ASSAY OF MAGNESIUM: CPT | Performed by: INTERNAL MEDICINE

## 2024-01-09 PROCEDURE — 80061 LIPID PANEL: CPT | Performed by: INTERNAL MEDICINE

## 2024-01-09 PROCEDURE — 80053 COMPREHEN METABOLIC PANEL: CPT | Performed by: INTERNAL MEDICINE

## 2024-01-09 PROCEDURE — 82172 ASSAY OF APOLIPOPROTEIN: CPT | Performed by: INTERNAL MEDICINE

## 2024-01-12 LAB — APO B SERPL-MCNC: 77 MG/DL

## 2024-02-12 ENCOUNTER — OFFICE VISIT (OUTPATIENT)
Dept: CARDIOLOGY | Facility: CLINIC | Age: 89
End: 2024-02-12
Payer: COMMERCIAL

## 2024-02-12 VITALS
BODY MASS INDEX: 24.78 KG/M2 | RESPIRATION RATE: 16 BRPM | OXYGEN SATURATION: 97 % | HEIGHT: 68 IN | WEIGHT: 163.5 LBS | HEART RATE: 74 BPM

## 2024-02-12 DIAGNOSIS — I25.10 ATHEROSCLEROSIS OF CORONARY ARTERY OF NATIVE HEART WITHOUT ANGINA PECTORIS, UNSPECIFIED VESSEL OR LESION TYPE: Primary | ICD-10-CM

## 2024-02-12 PROCEDURE — 3008F BODY MASS INDEX DOCD: CPT | Performed by: INTERNAL MEDICINE

## 2024-02-12 PROCEDURE — 93000 ELECTROCARDIOGRAM COMPLETE: CPT | Performed by: INTERNAL MEDICINE

## 2024-02-12 PROCEDURE — 99214 OFFICE O/P EST MOD 30 MIN: CPT | Performed by: INTERNAL MEDICINE

## 2024-02-12 RX ORDER — SILDENAFIL 25 MG/1
50 TABLET, FILM COATED ORAL DAILY PRN
Qty: 10 TABLET | Refills: 3 | Status: SHIPPED | OUTPATIENT
Start: 2024-02-12 | End: 2025-03-06

## 2024-02-12 ASSESSMENT — ENCOUNTER SYMPTOMS
DYSPNEA ON EXERTION: 0
DECREASED LIBIDO: 1

## 2024-02-12 NOTE — PROGRESS NOTES
Javi Aquino III, MD, [facility], [facility]  Interventional Cardiology      [facility]  [facility]  [facility] [address] [facility]  [address] [place] [address]    TEL  [phone]  Fax:  [phone]  [facility]            Outpatient Cardiology Office Note          History:  Jalen Warren is a 88 y.o. male who presents for cardiac evaluation.  The patient was last here in August.  He continues to play squash at least twice weekly although he is only playing doubles now.  His only complaint when playing squash is that he has trouble seeing the ball despite cataract surgery with lens implantation.  He attributes this to an astigmatism.  He is not having any symptoms of angina.      Past Medical History:   Diagnosis Date   • Coronary artery disease    • Lipid disorder      History reviewed. No pertinent surgical history.  No Known Allergies    Current Outpatient Medications:   •  aspirin 81 mg enteric coated tablet, Take 81 mg by mouth daily., Disp: , Rfl:   •  cholecalciferol, vitamin D3, 25 mcg (1,000 unit) capsule, Take 3,000 Units by mouth daily., Disp: , Rfl:   •  gentamicin (GARAMYCIN) 0.1 % ointment, APPLY LIBERALLY TO AFFECTED AREA TWICE DAILY AS DIRECTED., Disp: , Rfl:   •  losartan (COZAAR) 50 mg tablet, TAKE 1 TABLET BY MOUTH EVERY DAY, Disp: 90 tablet, Rfl: 3  •  magnesium chloride 71.5 mg tablet,delayed release (DR/EC), Take 71.5 mg by mouth daily., Disp: , Rfl:   •  rosuvastatin (CRESTOR) 10 mg tablet, TAKE 1 TABLET BY MOUTH EVERY DAY, Disp: 90 tablet, Rfl: 3  •  sildenafiL (VIAGRA) 25 mg tablet, Take 2 tablets (50 mg total) by mouth daily as needed for erectile dysfunction., Disp: 10 tablet, Rfl: 3  •  ZINC ORAL, Take by mouth., Disp: , Rfl:   Family History   Problem Relation Age of Onset   • Cancer Biological Mother    • No Known Problems Biological Father    • Heart disease Biological Brother      Social History     Socioeconomic History    • Marital status:      Spouse name: None   • Number of children: None   • Years of education: None   • Highest education level: None   Tobacco Use   • Smoking status: Never   • Smokeless tobacco: Never   Vaping Use   • Vaping Use: Never used   Substance and Sexual Activity   • Alcohol use: Yes     Comment: social   • Drug use: No   • Sexual activity: Defer         Review of Systems   Cardiovascular: Negative for chest pain, dyspnea on exertion and leg swelling.   Genitourinary: Positive for decreased libido.   All other systems reviewed and are negative.       Vitals:    02/12/24 0921   Pulse: 74   Resp: 16   SpO2: 97%       Weight: 74.2 kg (163 lb 8 oz)     Physical Exam  Vitals reviewed.   Constitutional:       Comments: Well-developed well-nourished male  Weight 163.5 pounds  Blood pressure 134/70 tilt negative  Skin warm dry  HEENT: Normocephalic atraumatic  Neck: No JVD or bruits  Lungs clear  Cardiac regular rate  Abdomen soft bowel sounds present  Extremities unremarkable.  Distal pulses are intact.         Lab Results   Component Value Date    WBC 5.54 01/09/2024    HGB 14.3 01/09/2024     01/09/2024    CHOL 154 01/09/2024    TRIG 142 01/09/2024    HDL 55 01/09/2024    ALT 27 01/09/2024    AST 19 01/09/2024     01/09/2024    K 4.2 01/09/2024    CREATININE 0.8 01/09/2024    TSH 1.92 01/09/2024    HGBA1C 6.0 (H) 01/09/2024      Imaging  Not applicable    ECG   sinus rhythm    ECHO     1.  Coronary artery disease.  Status post PCI of the posterolateral branch of the RCA over 13 years ago.  The patient is free of anginal discomfort.  He is aware of the need for prompt attention should there be a change in status.  We had talked about proceeding with a stress echo because of his history of transient lightheadedness and known CAD but the patient's not really interested in pursuing this    2.  Hypertension.  Blood pressure adequately controlled    3.  Dyslipidemia.  The patient will continue  his present statin regimen    4.  Erectile dysfunction.  The patient was given a prescription for sildenafil.  He is aware he cannot use nitrates           Javi Aquino III, MD  2/12/2024

## 2024-02-12 NOTE — LETTER
February 12, 2024     Robert Gallego, DO  [address] [place] [address] Ave  [place], [address] [address]    Patient: Jalen Warren  YOB: 1935  Date of Visit: 2/12/2024      Dear Dr. Gallego:    Thank you for referring Jalen Warren to me for evaluation. Below are my notes for this consultation.    If you have questions, please do not hesitate to call me. I look forward to following your patient along with you.         Sincerely,        Javi Aquino III, MD        CC: No Recipients    Javi Aquino III, MD  2/12/2024  9:50 AM  Signed        Javi Aquino III, MD, [facility], [facility]  Interventional Cardiology      [facility]  [facility]  [facility] Level  [address] [place] [address]    TEL  [phone]  Fax:  [phone]  [facility]            Outpatient Cardiology Office Note          History:  Jalen Warren is a 88 y.o. male who presents for cardiac evaluation.  The patient was last here in August.  He continues to play squash at least twice weekly although he is only playing doubles now.  His only complaint when playing squash is that he has trouble seeing the ball despite cataract surgery with lens implantation.  He attributes this to an astigmatism.  He is not having any symptoms of angina.      Past Medical History:   Diagnosis Date   • Coronary artery disease    • Lipid disorder      History reviewed. No pertinent surgical history.  No Known Allergies    Current Outpatient Medications:   •  aspirin 81 mg enteric coated tablet, Take 81 mg by mouth daily., Disp: , Rfl:   •  cholecalciferol, vitamin D3, 25 mcg (1,000 unit) capsule, Take 3,000 Units by mouth daily., Disp: , Rfl:   •  gentamicin (GARAMYCIN) 0.1 % ointment, APPLY LIBERALLY TO AFFECTED AREA TWICE DAILY AS DIRECTED., Disp: , Rfl:   •  losartan (COZAAR) 50 mg tablet, TAKE 1 TABLET BY MOUTH EVERY DAY, Disp: 90 tablet, Rfl: 3  •  magnesium chloride 71.5  mg tablet,delayed release (DR/EC), Take 71.5 mg by mouth daily., Disp: , Rfl:   •  rosuvastatin (CRESTOR) 10 mg tablet, TAKE 1 TABLET BY MOUTH EVERY DAY, Disp: 90 tablet, Rfl: 3  •  sildenafiL (VIAGRA) 25 mg tablet, Take 2 tablets (50 mg total) by mouth daily as needed for erectile dysfunction., Disp: 10 tablet, Rfl: 3  •  ZINC ORAL, Take by mouth., Disp: , Rfl:   Family History   Problem Relation Age of Onset   • Cancer Biological Mother    • No Known Problems Biological Father    • Heart disease Biological Brother      Social History     Socioeconomic History   • Marital status:      Spouse name: None   • Number of children: None   • Years of education: None   • Highest education level: None   Tobacco Use   • Smoking status: Never   • Smokeless tobacco: Never   Vaping Use   • Vaping Use: Never used   Substance and Sexual Activity   • Alcohol use: Yes     Comment: social   • Drug use: No   • Sexual activity: Defer         Review of Systems   Cardiovascular: Negative for chest pain, dyspnea on exertion and leg swelling.   Genitourinary: Positive for decreased libido.   All other systems reviewed and are negative.       Vitals:    02/12/24 0921   Pulse: 74   Resp: 16   SpO2: 97%       Weight: 74.2 kg (163 lb 8 oz)     Physical Exam  Vitals reviewed.   Constitutional:       Comments: Well-developed well-nourished male  Weight 163.5 pounds  Blood pressure 134/70 tilt negative  Skin warm dry  HEENT: Normocephalic atraumatic  Neck: No JVD or bruits  Lungs clear  Cardiac regular rate  Abdomen soft bowel sounds present  Extremities unremarkable.  Distal pulses are intact.         Lab Results   Component Value Date    WBC 5.54 01/09/2024    HGB 14.3 01/09/2024     01/09/2024    CHOL 154 01/09/2024    TRIG 142 01/09/2024    HDL 55 01/09/2024    ALT 27 01/09/2024    AST 19 01/09/2024     01/09/2024    K 4.2 01/09/2024    CREATININE 0.8 01/09/2024    TSH 1.92 01/09/2024    HGBA1C 6.0 (H) 01/09/2024       Imaging  Not applicable    ECG   sinus rhythm    ECHO     1.  Coronary artery disease.  Status post PCI of the posterolateral branch of the RCA over 13 years ago.  The patient is free of anginal discomfort.  He is aware of the need for prompt attention should there be a change in status.  We had talked about proceeding with a stress echo because of his history of transient lightheadedness and known CAD but the patient's not really interested in pursuing this    2.  Hypertension.  Blood pressure adequately controlled    3.  Dyslipidemia.  The patient will continue his present statin regimen    4.  Erectile dysfunction.  The patient was given a prescription for sildenafil.  He is aware he cannot use nitrates           Javi Aquino III, MD  2/12/2024

## 2024-07-30 ENCOUNTER — TELEPHONE (OUTPATIENT)
Dept: CARDIOLOGY | Facility: CLINIC | Age: 89
End: 2024-07-30
Payer: COMMERCIAL

## 2024-07-30 NOTE — TELEPHONE ENCOUNTER
Called pt and left a vm for the pt to c/b to have the appt on 08/12/24 r/s'd as the provider will be out of the office that day. Plz give pt next available appt w/Kenia

## 2024-07-31 NOTE — TELEPHONE ENCOUNTER
Pt called back for Kasia and pt scheduled with Dr Aquino 09/05 at 1:30pm.     Pt is asking for an morning appts. Monday, wednesday and Thursday are best days for patient.  Pt canbe reached at [phone]

## 2024-09-05 ENCOUNTER — OFFICE VISIT (OUTPATIENT)
Dept: CARDIOLOGY | Facility: CLINIC | Age: 89
End: 2024-09-05
Payer: COMMERCIAL

## 2024-09-05 VITALS — HEIGHT: 68 IN | WEIGHT: 157 LBS | BODY MASS INDEX: 23.79 KG/M2 | HEART RATE: 71 BPM | OXYGEN SATURATION: 97 %

## 2024-09-05 DIAGNOSIS — I25.10 ATHEROSCLEROSIS OF CORONARY ARTERY OF NATIVE HEART WITHOUT ANGINA PECTORIS, UNSPECIFIED VESSEL OR LESION TYPE: Primary | ICD-10-CM

## 2024-09-05 LAB
ATRIAL RATE: 71
P AXIS: 46
PR INTERVAL: 162
QRS DURATION: 92
QT INTERVAL: 404
QTC CALCULATION(BAZETT): 439
R AXIS: -8
T WAVE AXIS: 45
VENTRICULAR RATE: 71

## 2024-09-05 PROCEDURE — 93000 ELECTROCARDIOGRAM COMPLETE: CPT | Performed by: INTERNAL MEDICINE

## 2024-09-05 PROCEDURE — 3008F BODY MASS INDEX DOCD: CPT | Performed by: INTERNAL MEDICINE

## 2024-09-05 PROCEDURE — 99214 OFFICE O/P EST MOD 30 MIN: CPT | Performed by: INTERNAL MEDICINE

## 2024-09-05 RX ORDER — NEOMYCIN SULFATE, POLYMYXIN B SULFATE AND HYDROCORTISONE 10; 3.5; 1 MG/ML; MG/ML; [USP'U]/ML
SUSPENSION/ DROPS AURICULAR (OTIC)
COMMUNITY
Start: 2024-08-18

## 2024-09-05 ASSESSMENT — ENCOUNTER SYMPTOMS
CONSTITUTIONAL NEGATIVE: 1
WEIGHT GAIN: 0
WEIGHT LOSS: 0
DYSPNEA ON EXERTION: 0
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
## 2024-12-23 ENCOUNTER — TELEPHONE (OUTPATIENT)
Dept: SURGERY | Facility: CLINIC | Age: 89
End: 2024-12-23
Payer: COMMERCIAL

## 2025-03-06 ENCOUNTER — OFFICE VISIT (OUTPATIENT)
Dept: CARDIOLOGY | Facility: CLINIC | Age: 89
End: 2025-03-06
Payer: COMMERCIAL

## 2025-03-06 VITALS — HEART RATE: 65 BPM | HEIGHT: 67 IN | WEIGHT: 161 LBS | OXYGEN SATURATION: 98 % | BODY MASS INDEX: 25.27 KG/M2

## 2025-03-06 DIAGNOSIS — I25.10 ATHEROSCLEROSIS OF CORONARY ARTERY OF NATIVE HEART WITHOUT ANGINA PECTORIS, UNSPECIFIED VESSEL OR LESION TYPE: Primary | ICD-10-CM

## 2025-03-06 LAB
ATRIAL RATE: 65
P AXIS: 63
PR INTERVAL: 186
QRS DURATION: 86
QT INTERVAL: 418
QTC CALCULATION(BAZETT): 434
R AXIS: -2
T WAVE AXIS: 49
VENTRICULAR RATE: 65

## 2025-03-06 PROCEDURE — 93000 ELECTROCARDIOGRAM COMPLETE: CPT | Performed by: INTERNAL MEDICINE

## 2025-03-06 PROCEDURE — 99214 OFFICE O/P EST MOD 30 MIN: CPT | Performed by: INTERNAL MEDICINE

## 2025-03-06 PROCEDURE — 3008F BODY MASS INDEX DOCD: CPT | Performed by: INTERNAL MEDICINE

## 2025-03-06 RX ORDER — LOSARTAN POTASSIUM 50 MG/1
50 TABLET ORAL DAILY
Qty: 90 TABLET | Refills: 3 | Status: SHIPPED | OUTPATIENT
Start: 2025-03-06

## 2025-03-06 ASSESSMENT — ENCOUNTER SYMPTOMS
CONSTITUTIONAL NEGATIVE: 1
DYSPNEA ON EXERTION: 0

## 2025-03-06 NOTE — LETTER
March 6, 2025     Robert Gallego, DO  [address] [place] [address] Ave  [facility], [address] [address]    Patient: Jalen Warren  YOB: 1935  Date of Visit: 3/6/2025      Dear Dr. Gallego:    Thank you for referring Jalen Warren to me for evaluation. Below are my notes for this consultation.    If you have questions, please do not hesitate to call me. I look forward to following your patient along with you.         Sincerely,        Javi Aquino III, MD        CC: No Recipients    Jvai Aquino III, MD  3/6/2025 10:38 AM  Sign when Signing Visit        Javi Aquino III, MD, [facility], [facility]  Interventional Cardiology      [facility] HEART [facility]  [facility]  [facility] Level  [address] [place] [address]    TEL  [phone]  Fax:  [phone]  [facility]            Outpatient Cardiology Office Note          History:  Jalen Warren is a 89 y.o. male who presents for cardiac evaluation.  The patient does remain active.  He continues to swatch occasionally.  He has had 1 episode of substernal discomfort since his last visit.  This occurred in the morning while he was getting dressed.  It lasted approximately 2 to 3 minutes.  It resolved spontaneously.  He did not use sublingual nitroglycerin as he occasionally uses sildenafil.      Past Medical History:   Diagnosis Date    Coronary artery disease     Lipid disorder      No past surgical history on file.  No Known Allergies    Current Outpatient Medications:     aspirin 81 mg enteric coated tablet, Take 81 mg by mouth daily., Disp: , Rfl:     cholecalciferol, vitamin D3, 25 mcg (1,000 unit) capsule, Take 3,000 Units by mouth daily., Disp: , Rfl:     losartan (COZAAR) 50 mg tablet, Take 1 tablet (50 mg total) by mouth daily., Disp: 90 tablet, Rfl: 3    magnesium chloride 71.5 mg tablet,delayed release (DR/EC), Take 71.5 mg by mouth daily., Disp: , Rfl:      neomycin-polymyxin-hydrocortisone (CORTISPORIN) 3.5-10,000-1 mg/mL-unit/mL-% otic suspension, ADMINISTER 4 DROPS INTO BOTH EARS 4 TIMES A DAY FOR 10 DAYS., Disp: , Rfl:     rosuvastatin (CRESTOR) 10 mg tablet, TAKE 1 TABLET BY MOUTH EVERY DAY, Disp: 90 tablet, Rfl: 3    sildenafiL (VIAGRA) 25 mg tablet, Take 2 tablets (50 mg total) by mouth daily as needed for erectile dysfunction., Disp: 10 tablet, Rfl: 3    ZINC ORAL, Take by mouth., Disp: , Rfl:     gentamicin (GARAMYCIN) 0.1 % ointment, APPLY LIBERALLY TO AFFECTED AREA TWICE DAILY AS DIRECTED., Disp: , Rfl:   Family History   Problem Relation Name Age of Onset    Cancer Biological Mother      No Known Problems Biological Father      Heart disease Biological Brother       Social History     Socioeconomic History    Marital status:      Spouse name: None    Number of children: None    Years of education: None    Highest education level: None   Tobacco Use    Smoking status: Never    Smokeless tobacco: Never   Vaping Use    Vaping status: Never Used   Substance and Sexual Activity    Alcohol use: Yes     Comment: social    Drug use: No    Sexual activity: Defer         Review of Systems   Constitutional: Negative.   Cardiovascular:  Positive for chest pain. Negative for dyspnea on exertion.   All other systems reviewed and are negative.       Vitals:    03/06/25 1001   Pulse: 65   SpO2: 98%       Weight: 73 kg (161 lb)     Physical Exam  Vitals reviewed.   Constitutional:       Comments: Well-developed male  Weight 161 pounds up 4 pounds  Blood pressure 130/72  Skin warm dry  HEENT: Normocephalic atraumatic  Neck: No JVD or bruits  Lungs clear  Cardiac regular rate tones are of good quality.  Abdomen soft bowel sounds present  Extremities distal pulses are full no clubbing no cyanosis no edema         Lab Results   Component Value Date    WBC 5.54 01/09/2024    HGB 14.3 01/09/2024     01/09/2024    CHOL 154 01/09/2024    TRIG 142  01/09/2024    HDL 55 01/09/2024    ALT 27 01/09/2024    AST 19 01/09/2024     01/09/2024    K 4.2 01/09/2024    CREATININE 0.8 01/09/2024    TSH 1.92 01/09/2024    HGBA1C 6.0 (H) 01/09/2024      Imaging  Not applicable    ECG   sinus rhythm anterior scar cannot be excluded    ECHO      Coronary artery disease.  Status post PCI of the posterolateral branch of the RCA over 14-1/2 years ago.  As noted the patient did have 1 episode of presumed angina since his last visit.  We talked about the use of sublingual nitroglycerin although I am reluctant to give it to him as he does use sildenafil with some regularity.  He is aware of the need for prompt attention should these episodes become more frequent.  As reported previously he has not been interested in noninvasive testing unless his symptoms accelerate.    2.  Hypertension.  Pressures well-controlled    3.  Dyslipidemia.  Patient will continue his present statin regimen    4.  Mild elevations in hemoglobin A1c levels.  I do not have his most recent blood work.         Javi Aquino III, MD  3/6/2025

## 2025-03-06 NOTE — LETTER
March 6, 2025     Robert Gallego, DO  [address] [place] [address] Ave  [facility], [address] [address]    Patient: Jalen Warren  YOB: 1935  Date of Visit: 3/6/2025      Dear Dr. Gallego:    Thank you for referring Jalen Warren to me for evaluation. Below are my notes for this consultation.    If you have questions, please do not hesitate to call me. I look forward to following your patient along with you.         Sincerely,        Javi Aquino III, MD        CC: No Recipients    Javi Aquino III, MD  3/6/2025 10:38 AM  Sign when Signing Visit        Javi Aquino III, MD, [facility], [facility]  Interventional Cardiology      [facility] HEART [facility]  [facility]  [facility] Level  [address] [place] [address]    TEL  [phone]  Fax:  [phone]  [facility]            Outpatient Cardiology Office Note          History:  Jalen Warren is a 89 y.o. male who presents for cardiac evaluation.  The patient does remain active.  He continues to swatch occasionally.  He has had 1 episode of substernal discomfort since his last visit.  This occurred in the morning while he was getting dressed.  It lasted approximately 2 to 3 minutes.  It resolved spontaneously.  He did not use sublingual nitroglycerin as he occasionally uses sildenafil.      Past Medical History:   Diagnosis Date    Coronary artery disease     Lipid disorder      No past surgical history on file.  No Known Allergies    Current Outpatient Medications:     aspirin 81 mg enteric coated tablet, Take 81 mg by mouth daily., Disp: , Rfl:     cholecalciferol, vitamin D3, 25 mcg (1,000 unit) capsule, Take 3,000 Units by mouth daily., Disp: , Rfl:     losartan (COZAAR) 50 mg tablet, Take 1 tablet (50 mg total) by mouth daily., Disp: 90 tablet, Rfl: 3    magnesium chloride 71.5 mg tablet,delayed release (DR/EC), Take 71.5 mg by mouth daily., Disp: , Rfl:      neomycin-polymyxin-hydrocortisone (CORTISPORIN) 3.5-10,000-1 mg/mL-unit/mL-% otic suspension, ADMINISTER 4 DROPS INTO BOTH EARS 4 TIMES A DAY FOR 10 DAYS., Disp: , Rfl:     rosuvastatin (CRESTOR) 10 mg tablet, TAKE 1 TABLET BY MOUTH EVERY DAY, Disp: 90 tablet, Rfl: 3    sildenafiL (VIAGRA) 25 mg tablet, Take 2 tablets (50 mg total) by mouth daily as needed for erectile dysfunction., Disp: 10 tablet, Rfl: 3    ZINC ORAL, Take by mouth., Disp: , Rfl:     gentamicin (GARAMYCIN) 0.1 % ointment, APPLY LIBERALLY TO AFFECTED AREA TWICE DAILY AS DIRECTED., Disp: , Rfl:   Family History   Problem Relation Name Age of Onset    Cancer Biological Mother      No Known Problems Biological Father      Heart disease Biological Brother       Social History     Socioeconomic History    Marital status:      Spouse name: None    Number of children: None    Years of education: None    Highest education level: None   Tobacco Use    Smoking status: Never    Smokeless tobacco: Never   Vaping Use    Vaping status: Never Used   Substance and Sexual Activity    Alcohol use: Yes     Comment: social    Drug use: No    Sexual activity: Defer         Review of Systems   Constitutional: Negative.   Cardiovascular:  Positive for chest pain. Negative for dyspnea on exertion.   All other systems reviewed and are negative.       Vitals:    03/06/25 1001   Pulse: 65   SpO2: 98%       Weight: 73 kg (161 lb)     Physical Exam  Vitals reviewed.   Constitutional:       Comments: Well-developed male  Weight 161 pounds up 4 pounds  Blood pressure 130/72  Skin warm dry  HEENT: Normocephalic atraumatic  Neck: No JVD or bruits  Lungs clear  Cardiac regular rate tones are of good quality.  Abdomen soft bowel sounds present  Extremities distal pulses are full no clubbing no cyanosis no edema         Lab Results   Component Value Date    WBC 5.54 01/09/2024    HGB 14.3 01/09/2024     01/09/2024    CHOL 154 01/09/2024    TRIG 142  01/09/2024    HDL 55 01/09/2024    ALT 27 01/09/2024    AST 19 01/09/2024     01/09/2024    K 4.2 01/09/2024    CREATININE 0.8 01/09/2024    TSH 1.92 01/09/2024    HGBA1C 6.0 (H) 01/09/2024      Imaging  Not applicable    ECG   sinus rhythm anterior scar cannot be excluded    ECHO      Coronary artery disease.  Status post PCI of the posterolateral branch of the RCA over 14-1/2 years ago.  As noted the patient did have 1 episode of presumed angina since his last visit.  We talked about the use of sublingual nitroglycerin although I am reluctant to give it to him as he does use sildenafil with some regularity.  He is aware of the need for prompt attention should these episodes become more frequent.  As reported previously he has not been interested in noninvasive testing unless his symptoms accelerate.    2.  Hypertension.  Pressures well-controlled    3.  Dyslipidemia.  Patient will continue his present statin regimen    4.  Mild elevations in hemoglobin A1c levels.  I do not have his most recent blood work.         Javi Aquino III, MD  3/6/2025

## 2025-03-06 NOTE — PROGRESS NOTES
Javi Aquino III, MD, [facility], [facility]  Interventional Cardiology      [facility] HEART GROUP  [facility]  [facility] Level  [address] [place] [address]    TEL  [phone]  Fax:  [phone]  [facility]            Outpatient Cardiology Office Note          History:  Jalen Warren is a 89 y.o. male who presents for cardiac evaluation.  The patient does remain active.  He continues to swatch occasionally.  He has had 1 episode of substernal discomfort since his last visit.  This occurred in the morning while he was getting dressed.  It lasted approximately 2 to 3 minutes.  It resolved spontaneously.  He did not use sublingual nitroglycerin as he occasionally uses sildenafil.      Past Medical History:   Diagnosis Date    Coronary artery disease     Lipid disorder      No past surgical history on file.  No Known Allergies    Current Outpatient Medications:     aspirin 81 mg enteric coated tablet, Take 81 mg by mouth daily., Disp: , Rfl:     cholecalciferol, vitamin D3, 25 mcg (1,000 unit) capsule, Take 3,000 Units by mouth daily., Disp: , Rfl:     losartan (COZAAR) 50 mg tablet, Take 1 tablet (50 mg total) by mouth daily., Disp: 90 tablet, Rfl: 3    magnesium chloride 71.5 mg tablet,delayed release (DR/EC), Take 71.5 mg by mouth daily., Disp: , Rfl:     neomycin-polymyxin-hydrocortisone (CORTISPORIN) 3.5-10,000-1 mg/mL-unit/mL-% otic suspension, ADMINISTER 4 DROPS INTO BOTH EARS 4 TIMES A DAY FOR 10 DAYS., Disp: , Rfl:     rosuvastatin (CRESTOR) 10 mg tablet, TAKE 1 TABLET BY MOUTH EVERY DAY, Disp: 90 tablet, Rfl: 3    sildenafiL (VIAGRA) 25 mg tablet, Take 2 tablets (50 mg total) by mouth daily as needed for erectile dysfunction., Disp: 10 tablet, Rfl: 3    ZINC ORAL, Take by mouth., Disp: , Rfl:     gentamicin (GARAMYCIN) 0.1 % ointment, APPLY LIBERALLY TO AFFECTED AREA TWICE DAILY AS DIRECTED., Disp: , Rfl:   Family History   Problem Relation  Name Age of Onset    Cancer Biological Mother      No Known Problems Biological Father      Heart disease Biological Brother       Social History     Socioeconomic History    Marital status:      Spouse name: None    Number of children: None    Years of education: None    Highest education level: None   Tobacco Use    Smoking status: Never    Smokeless tobacco: Never   Vaping Use    Vaping status: Never Used   Substance and Sexual Activity    Alcohol use: Yes     Comment: social    Drug use: No    Sexual activity: Defer         Review of Systems   Constitutional: Negative.   Cardiovascular:  Positive for chest pain. Negative for dyspnea on exertion.   All other systems reviewed and are negative.       Vitals:    03/06/25 1001   Pulse: 65   SpO2: 98%       Weight: 73 kg (161 lb)     Physical Exam  Vitals reviewed.   Constitutional:       Comments: Well-developed male  Weight 161 pounds up 4 pounds  Blood pressure 130/72  Skin warm dry  HEENT: Normocephalic atraumatic  Neck: No JVD or bruits  Lungs clear  Cardiac regular rate tones are of good quality.  Abdomen soft bowel sounds present  Extremities distal pulses are full no clubbing no cyanosis no edema         Lab Results   Component Value Date    WBC 5.54 01/09/2024    HGB 14.3 01/09/2024     01/09/2024    CHOL 154 01/09/2024    TRIG 142 01/09/2024    HDL 55 01/09/2024    ALT 27 01/09/2024    AST 19 01/09/2024     01/09/2024    K 4.2 01/09/2024    CREATININE 0.8 01/09/2024    TSH 1.92 01/09/2024    HGBA1C 6.0 (H) 01/09/2024      Imaging  Not applicable    ECG   sinus rhythm anterior scar cannot be excluded    ECHO      Coronary artery disease.  Status post PCI of the posterolateral branch of the RCA over 14-1/2 years ago.  As noted the patient did have 1 episode of presumed angina since his last visit.  We talked about the use of sublingual nitroglycerin although I am reluctant to give it to him as he does use sildenafil with some regularity.   He is aware of the need for prompt attention should these episodes become more frequent.  As reported previously he has not been interested in noninvasive testing unless his symptoms accelerate.    2.  Hypertension.  Pressures well-controlled    3.  Dyslipidemia.  Patient will continue his present statin regimen    4.  Mild elevations in hemoglobin A1c levels.  I do not have his most recent blood work.         Javi Aquino III, MD  3/6/2025

## 2025-05-29 ENCOUNTER — TELEPHONE (OUTPATIENT)
Dept: CARDIOLOGY | Facility: CLINIC | Age: 89
End: 2025-05-29
Payer: COMMERCIAL

## 2025-05-29 DIAGNOSIS — I25.84 CORONARY ARTERY DISEASE DUE TO CALCIFIED CORONARY LESION: Primary | ICD-10-CM

## 2025-05-29 DIAGNOSIS — Z01.810 PREPROCEDURAL CARDIOVASCULAR EXAMINATION: ICD-10-CM

## 2025-05-29 DIAGNOSIS — I25.10 CORONARY ARTERY DISEASE DUE TO CALCIFIED CORONARY LESION: Primary | ICD-10-CM

## 2025-06-05 PROBLEM — I25.10 CORONARY ARTERY DISEASE DUE TO CALCIFIED CORONARY LESION: Status: ACTIVE | Noted: 2025-05-29

## 2025-06-05 PROBLEM — I25.84 CORONARY ARTERY DISEASE DUE TO CALCIFIED CORONARY LESION: Status: ACTIVE | Noted: 2025-05-29

## 2025-06-11 ENCOUNTER — APPOINTMENT (OUTPATIENT)
Dept: LAB | Facility: HOSPITAL | Age: 89
End: 2025-06-11
Attending: INTERNAL MEDICINE
Payer: COMMERCIAL

## 2025-06-11 DIAGNOSIS — I25.10 CORONARY ARTERY DISEASE DUE TO CALCIFIED CORONARY LESION: ICD-10-CM

## 2025-06-11 DIAGNOSIS — I25.84 CORONARY ARTERY DISEASE DUE TO CALCIFIED CORONARY LESION: ICD-10-CM

## 2025-06-11 DIAGNOSIS — I25.10 ATHEROSCLEROSIS OF CORONARY ARTERY OF NATIVE HEART WITHOUT ANGINA PECTORIS, UNSPECIFIED VESSEL OR LESION TYPE: ICD-10-CM

## 2025-06-11 LAB
ALBUMIN SERPL-MCNC: 4.3 G/DL (ref 3.5–5.7)
ALP SERPL-CCNC: 30 IU/L (ref 34–125)
ALT SERPL-CCNC: 18 IU/L (ref 7–52)
ANION GAP SERPL CALC-SCNC: 7 MEQ/L (ref 3–15)
AST SERPL-CCNC: 18 IU/L (ref 13–39)
BASOPHILS # BLD: 0.04 K/UL (ref 0.01–0.1)
BASOPHILS NFR BLD: 0.7 %
BILIRUB DIRECT SERPL-MCNC: 0.2 MG/DL
BILIRUB SERPL-MCNC: 0.9 MG/DL (ref 0.3–1.2)
BUN SERPL-MCNC: 14 MG/DL (ref 7–25)
CALCIUM SERPL-MCNC: 9.3 MG/DL (ref 8.6–10.3)
CHLORIDE SERPL-SCNC: 105 MEQ/L (ref 98–107)
CHOLEST SERPL-MCNC: 139 MG/DL
CO2 SERPL-SCNC: 28 MEQ/L (ref 21–31)
CREAT SERPL-MCNC: 0.7 MG/DL (ref 0.7–1.3)
DIFFERENTIAL METHOD BLD: NORMAL
EGFRCR SERPLBLD CKD-EPI 2021: >60 ML/MIN/1.73M*2
EOSINOPHIL # BLD: 0.06 K/UL (ref 0.04–0.54)
EOSINOPHIL NFR BLD: 1.1 %
ERYTHROCYTE [DISTWIDTH] IN BLOOD BY AUTOMATED COUNT: 13 % (ref 11.6–14.4)
GLUCOSE SERPL-MCNC: 101 MG/DL (ref 70–99)
HCT VFR BLD AUTO: 42.2 % (ref 40.1–51)
HDLC SERPL-MCNC: 61 MG/DL
HDLC SERPL: 2.3 {RATIO}
HGB BLD-MCNC: 14 G/DL (ref 13.7–17.5)
IMM GRANULOCYTES # BLD AUTO: 0.02 K/UL (ref 0–0.08)
IMM GRANULOCYTES NFR BLD AUTO: 0.4 %
LDLC SERPL CALC-MCNC: 62 MG/DL
LYMPHOCYTES # BLD: 1.5 K/UL (ref 1.2–3.5)
LYMPHOCYTES NFR BLD: 27.8 %
MCH RBC QN AUTO: 29.6 PG (ref 28–33.2)
MCHC RBC AUTO-ENTMCNC: 33.2 G/DL (ref 32.2–36.5)
MCV RBC AUTO: 89.2 FL (ref 83–98)
MONOCYTES # BLD: 0.62 K/UL (ref 0.3–1)
MONOCYTES NFR BLD: 11.5 %
NEUTROPHILS # BLD: 3.15 K/UL (ref 1.7–7)
NEUTS SEG NFR BLD: 58.5 %
NONHDLC SERPL-MCNC: 78 MG/DL
NRBC BLD-RTO: 0 %
PLATELET # BLD AUTO: 158 K/UL (ref 150–350)
PMV BLD AUTO: 10.2 FL (ref 9.4–12.4)
POTASSIUM SERPL-SCNC: 4.3 MEQ/L (ref 3.5–5.1)
PROT SERPL-MCNC: 6.3 G/DL (ref 6–8.2)
RBC # BLD AUTO: 4.73 M/UL (ref 4.5–5.8)
SODIUM SERPL-SCNC: 140 MEQ/L (ref 136–145)
TRIGL SERPL-MCNC: 80 MG/DL
WBC # BLD AUTO: 5.39 K/UL (ref 3.8–10.5)

## 2025-06-11 PROCEDURE — 80061 LIPID PANEL: CPT

## 2025-06-11 PROCEDURE — 80053 COMPREHEN METABOLIC PANEL: CPT

## 2025-06-11 PROCEDURE — 85025 COMPLETE CBC W/AUTO DIFF WBC: CPT

## 2025-06-11 PROCEDURE — 36415 COLL VENOUS BLD VENIPUNCTURE: CPT

## 2025-06-17 RX ORDER — ASPIRIN 325 MG
325 TABLET ORAL ONCE
Status: CANCELLED | OUTPATIENT
Start: 2025-06-18 | End: 2025-06-18

## 2025-06-17 NOTE — ASSESSMENT & PLAN NOTE
PT with hx of cad with PCI of post lat branch of RCA   Pt experiencing episodes of chest discomfort  For cardiac catheterization today  Creat 0.7

## 2025-06-17 NOTE — H&P
Cardiology History and Physical     Admitting Diagnosis   Coronary artery disease due to calcified coronary lesion [I25.10, I25.84]   HPI    Jalen Warren is a 89 y.o. male with PMH of cad and dyslipidemia  who presents to [facility] for cardiac catheterization. Pt with hx of cad with PCI of post lat branch of RCA. Pt experiencing episodes of chest discomfort  For cardiac catheterization today      Medical History   Medical History:   Past Medical History:   Diagnosis Date    Coronary artery disease     Lipid disorder        Surgical History: No past surgical history on file.    Allergies: Patient has no known allergies.    No current facility-administered medications for this encounter.     Current Outpatient Medications   Medication Sig Dispense Refill    aspirin 81 mg enteric coated tablet Take 81 mg by mouth daily.      cholecalciferol, vitamin D3, 25 mcg (1,000 unit) capsule Take 3,000 Units by mouth daily.      gentamicin (GARAMYCIN) 0.1 % ointment APPLY LIBERALLY TO AFFECTED AREA TWICE DAILY AS DIRECTED.      losartan (COZAAR) 50 mg tablet Take 1 tablet (50 mg total) by mouth daily. 90 tablet 3    magnesium chloride 71.5 mg tablet,delayed release (DR/EC) Take 71.5 mg by mouth daily.      neomycin-polymyxin-hydrocortisone (CORTISPORIN) 3.5-10,000-1 mg/mL-unit/mL-% otic suspension ADMINISTER 4 DROPS INTO BOTH EARS 4 TIMES A DAY FOR 10 DAYS.      rosuvastatin (CRESTOR) 10 mg tablet TAKE 1 TABLET BY MOUTH EVERY DAY 90 tablet 3    sildenafiL (VIAGRA) 25 mg tablet Take 2 tablets (50 mg total) by mouth daily as needed for erectile dysfunction. 10 tablet 3    ZINC ORAL Take by mouth.         Social History:   Social History     Socioeconomic History    Marital status:    Tobacco Use    Smoking status: Never    Smokeless tobacco: Never   Vaping Use    Vaping status: Never Used   Substance and Sexual Activity    Alcohol use: Yes     Comment: social    Drug use: No    Sexual activity: Defer       Family  History:   Family History   Problem Relation Name Age of Onset    Cancer Biological Mother      No Known Problems Biological Father      Heart disease Biological Brother           Review of Systems   {Review Of Systems:56855}   Objective    Vital Signs for the last 24 hours   BP: ()/()   Arterial Line BP: ()/()        Physical Exam   {Exam; Complete Normal And System Select:14631}      Labs   Lab Results   Component Value Date    WBC 5.39 06/11/2025    HGB 14.0 06/11/2025    HCT 42.2 06/11/2025     06/11/2025    CHOL 139 06/11/2025    TRIG 80 06/11/2025    HDL 61 06/11/2025    ALT 18 06/11/2025    AST 18 06/11/2025     06/11/2025    K 4.3 06/11/2025     06/11/2025    CREATININE 0.7 06/11/2025    BUN 14 06/11/2025    CO2 28 06/11/2025    TSH 1.92 01/09/2024    PSA 0.38 01/18/2023    HGBA1C 6.0 (H) 01/09/2024     Troponin I Results    No lab values to display.           Imaging   {Imaging reviewed last 24H:68404}     Cardiac Imaging    ECHOCARDIOGRAM STRESS TEST     Narrative  Ordered by an unspecified provider.         ECG     {Findings; ecg normal:03388}   Telemetry   {Findings; telemetry normal:42911}      Assessment/Plan      Coronary artery disease due to calcified coronary lesion    PT with hx of cad with PCI of post lat branch of RCA   Pt experiencing episodes of chest discomfort  For cardiac catheterization today  Creat 0.7      Hyperlipidemia  On crestor                   MEAGHAN Mays  6/17/2025  4:09 PM

## 2025-06-18 ENCOUNTER — HOSPITAL ENCOUNTER (OUTPATIENT)
Facility: HOSPITAL | Age: 89
Setting detail: HOSPITAL OUTPATIENT SURGERY
Discharge: HOME | End: 2025-06-18
Attending: INTERNAL MEDICINE | Admitting: INTERNAL MEDICINE
Payer: COMMERCIAL

## 2025-06-18 VITALS
SYSTOLIC BLOOD PRESSURE: 167 MMHG | OXYGEN SATURATION: 95 % | HEART RATE: 70 BPM | WEIGHT: 160 LBS | TEMPERATURE: 97.2 F | BODY MASS INDEX: 25.71 KG/M2 | DIASTOLIC BLOOD PRESSURE: 81 MMHG | RESPIRATION RATE: 18 BRPM | HEIGHT: 66 IN

## 2025-06-18 DIAGNOSIS — I25.84 CORONARY ARTERY DISEASE DUE TO CALCIFIED CORONARY LESION: ICD-10-CM

## 2025-06-18 DIAGNOSIS — I25.10 CORONARY ARTERY DISEASE DUE TO CALCIFIED CORONARY LESION: ICD-10-CM

## 2025-06-18 LAB
ATRIAL RATE: 65
P AXIS: 38
PR INTERVAL: 186
QRS DURATION: 90
QT INTERVAL: 412
QTC CALCULATION(BAZETT): 428
R AXIS: -14
T WAVE AXIS: 20
VENTRICULAR RATE: 65

## 2025-06-18 PROCEDURE — 93005 ELECTROCARDIOGRAM TRACING: CPT | Performed by: INTERNAL MEDICINE

## 2025-06-18 PROCEDURE — 99223 1ST HOSP IP/OBS HIGH 75: CPT | Performed by: INTERNAL MEDICINE

## 2025-06-18 PROCEDURE — 93010 ELECTROCARDIOGRAM REPORT: CPT | Performed by: INTERNAL MEDICINE

## 2025-06-18 PROCEDURE — 63700000 HC SELF-ADMINISTRABLE DRUG: Performed by: INTERNAL MEDICINE

## 2025-06-18 RX ORDER — NITROGLYCERIN 0.4 MG/1
0.4 TABLET SUBLINGUAL EVERY 5 MIN PRN
Qty: 25 TABLET | Refills: 1 | Status: SHIPPED | OUTPATIENT
Start: 2025-06-18 | End: 2025-07-18

## 2025-06-18 RX ORDER — ISOSORBIDE MONONITRATE 30 MG/1
TABLET, EXTENDED RELEASE ORAL
Qty: 94 TABLET | Refills: 0 | Status: SHIPPED | OUTPATIENT
Start: 2025-06-18 | End: 2025-07-25

## 2025-06-18 RX ORDER — SODIUM CHLORIDE 9 MG/ML
40 INJECTION, SOLUTION INTRAVENOUS CONTINUOUS
Status: DISCONTINUED | OUTPATIENT
Start: 2025-06-18 | End: 2025-06-18 | Stop reason: HOSPADM

## 2025-06-18 RX ORDER — AMLODIPINE BESYLATE 2.5 MG/1
2.5 TABLET ORAL DAILY
Qty: 90 TABLET | Refills: 3 | Status: SHIPPED | OUTPATIENT
Start: 2025-06-18 | End: 2026-06-18

## 2025-06-18 RX ORDER — LOSARTAN POTASSIUM 25 MG/1
25 TABLET ORAL DAILY
Qty: 90 TABLET | Refills: 3 | Status: SHIPPED | OUTPATIENT
Start: 2025-06-18 | End: 2026-06-18

## 2025-06-18 RX ORDER — NAPROXEN SODIUM 220 MG/1
243 TABLET, FILM COATED ORAL ONCE
Status: COMPLETED | OUTPATIENT
Start: 2025-06-18 | End: 2025-06-18

## 2025-06-18 RX ADMIN — ASPIRIN 243 MG: 81 TABLET, CHEWABLE ORAL at 06:43

## 2025-06-18 NOTE — H&P
"   History and Physical  [facility]  Cardiac Catheterization/EP Lab  6/18/2025     Admitting Diagnosis   Coronary artery disease due to calcified coronary lesion [I25.10, I25.84]  Chest Pain   HPI    Jalen Warren is a 89 y.o. male with PMH of CAD (~14 years ago PCI of PLB of RCA), HTN, HLD who presents to [facility] for cardiac catheterization. Pt reports 6 month history of LH (described as feeling \"wobbly,\" not related to position or BP) and exertional CP (radiating down both arms); these do not occur simultaneous.  CP does not occur every time he is active; last episode was a few weeks ago. Pt takes ASA daily and denies bleeding concerns. Pt also reports rare palpitations, most noticeable at night while in bed, described as brief tachycardia.      Pt called PCP and arrangements made through Dr. Mcclure's office for LHC today    Currently pt denies CP    Pt denies SOB, MENJIVAR, near syncope or syncope.        Medical History   Medical History:   Past Medical History:   Diagnosis Date    Coronary artery disease     Lipid disorder    HTN    Surgical History: No past surgical history on file.    Allergies: Patient has no known allergies.    Medications Ordered Prior to Encounter[1]     Current Facility-Administered Medications   Medication Dose Route Frequency Provider Last Rate Last Admin    sodium chloride 0.9 % infusion  40 mL/hr intravenous Continuous Tiffanie Carrington CRNP           Social History:   Social History     Socioeconomic History    Marital status:      Spouse name: None    Number of children: None    Years of education: None    Highest education level: None   Tobacco Use    Smoking status: Never    Smokeless tobacco: Never   Vaping Use    Vaping status: Never Used   Substance and Sexual Activity    Alcohol use: Yes     Comment: social    Drug use: No    Sexual activity: Defer       Family History:   Family History   Problem Relation Name Age of Onset    Cancer " Biological Mother      No Known Problems Biological Father      Heart disease Biological Brother           Review of Systems   All other systems reviewed and negative except as noted in the HPI.   Objective    Vital Signs for the last 24 hours   Temp:  [36.2 °C (97.2 °F)] 36.2 °C (97.2 °F)  Heart Rate:  [70] 70  Resp:  [18] 18  BP: (167)/(81) 167/81       Physical Exam   GEN: resting comfortably in bed, in NAD  HEENT: normocephalic; atraumatic  NECK: no visible JVD  CARDIO: +S1 and S2, RRR  RESP: CTA b/l  ABD: +BS x 4, soft, NT/ND  EXT: +2 radial artery pulse  No LE edema  SKIN: warm and dry; petechiae on torso (pt reports chronic)  NEURO: COA x 3      Labs   Lab Results   Component Value Date    WBC 5.39 06/11/2025    HGB 14.0 06/11/2025    HCT 42.2 06/11/2025     06/11/2025    CHOL 139 06/11/2025    TRIG 80 06/11/2025    HDL 61 06/11/2025    ALT 18 06/11/2025    AST 18 06/11/2025     06/11/2025    K 4.3 06/11/2025     06/11/2025    CREATININE 0.7 06/11/2025    BUN 14 06/11/2025    CO2 28 06/11/2025    TSH 1.92 01/09/2024    PSA 0.38 01/18/2023    HGBA1C 6.0 (H) 01/09/2024     Troponin I Results    No lab values to display.           Imaging        Cardiac Imaging    None recently     ECG     Obtain now   Telemetry   SR in 60s      Assessment/Plan      89 y.o. male with PMH of CAD (~14 years ago PCI of PLB of RCA), HTN, HLD who presents to [facility] for cardiac catheterization.      Exertional CP  CAD (~14 years ago PCI of PLB of RCA)  HTN  HLD      -Will review pt with cardiology attending, Dr. Aquino  -Exertional CP  -CAD, ~14 years ago PCI of PLB of RCA  -For [facility] today  -Plan will be based on findings.  -Continue losartan, statin, ASA  -Recommend OP fu with cards and PCP     I personally performed a history and physical.  The patient was seen and examined in the holding area of the Cath Lab.  The patient's spouse was in attendance.    The patient continues to have periods of  substernal chest discomfort which she describes as angina.  Following today's discussion, he has decided against undergoing cardiac catheterization in view of his advanced age and wishes.  He fully understands the implications of this decision and realizes we may be under treating the extent of his coronary artery disease.    The cardiac catheterization will be canceled.  The patient will be discharged to home.  Losartan will be reduced to 25 mg daily.  He will continue aspirin 81 mg daily for its antiplatelet benefit.  Patient will begin amlodipine 2.5 mg nightly plus isosorbide mononitrate 15 mg daily in the morning for 1 week and then increase the dosage to 30 mg daily.  The patient was instructed that he cannot use sildenafil under any circumstance while concurrent use of nitrates.  We will continue rosuvastatin 10 mg nightly.  Prescription for sublingual nitroglycerin was also provided to the patient.  I will see the patient in follow-up in the office.  All questions were answered.     QUINCY Higginbotham  6/18/2025  6:52 AM               [1]   No current facility-administered medications on file prior to encounter.     Current Outpatient Medications on File Prior to Encounter   Medication Sig Dispense Refill    aspirin 81 mg enteric coated tablet Take 81 mg by mouth daily.      cholecalciferol, vitamin D3, 25 mcg (1,000 unit) capsule Take 3,000 Units by mouth daily.      losartan (COZAAR) 50 mg tablet Take 1 tablet (50 mg total) by mouth daily. 90 tablet 3    magnesium chloride 71.5 mg tablet,delayed release (DR/EC) Take 71.5 mg by mouth daily.      rosuvastatin (CRESTOR) 10 mg tablet TAKE 1 TABLET BY MOUTH EVERY DAY 90 tablet 3    ZINC ORAL Take by mouth.      sildenafiL (VIAGRA) 25 mg tablet Take 2 tablets (50 mg total) by mouth daily as needed for erectile dysfunction. 10 tablet 3    [DISCONTINUED] gentamicin (GARAMYCIN) 0.1 % ointment APPLY LIBERALLY TO AFFECTED AREA TWICE DAILY AS DIRECTED.       [DISCONTINUED] neomycin-polymyxin-hydrocortisone (CORTISPORIN) 3.5-10,000-1 mg/mL-unit/mL-% otic suspension ADMINISTER 4 DROPS INTO BOTH EARS 4 TIMES A DAY FOR 10 DAYS.

## 2025-06-18 NOTE — PRE-PROCEDURE NOTE
Cardiac Cath Lab Pre-procedure Note    - Patient was seen and examined at bedside.  - The patient's chart and all data was reviewed.  - The procedure, treatment alternatives, risks and benefits were explained with specific risks discussed.  - Patient was consented for cardiac cath procedure and possible PCI.  - Patient's case was found appropriate for dual antiplatelet therapy.    Indication for procedure: Pre-Op Diagnosis Codes:      * Coronary artery disease due to calcified coronary lesion [I25.10, I25.84]    Patient's clinical presentation to the cardiac cath lab: stable ischemic symptoms.              Total anti-anginal medications: none.     Patient is presenting today with no CHF.    Pre-sedation assessment  ASA 3  Mallampati class: II - soft palate, uvula, fauces visible.

## 2025-06-18 NOTE — DISCHARGE INSTRUCTIONS
Note medication changes made    These medications will lower your blood pressure, if you develop lightheadedness especially with position change or have any concerns immediately notify the office.    Due to the med changes you will need to see Dr. Aquino or Dr. Gallego within the next 2 weeks ,please call to arrange an appointment

## 2025-08-08 ENCOUNTER — TELEPHONE (OUTPATIENT)
Dept: SCHEDULING | Facility: CLINIC | Age: 89
End: 2025-08-08
Payer: COMMERCIAL

## 2025-08-13 ENCOUNTER — OFFICE VISIT (OUTPATIENT)
Dept: CARDIOLOGY | Facility: CLINIC | Age: 89
End: 2025-08-13
Payer: COMMERCIAL

## 2025-08-13 VITALS
HEART RATE: 60 BPM | SYSTOLIC BLOOD PRESSURE: 118 MMHG | DIASTOLIC BLOOD PRESSURE: 70 MMHG | WEIGHT: 158 LBS | HEIGHT: 68 IN | BODY MASS INDEX: 23.95 KG/M2

## 2025-08-13 DIAGNOSIS — I25.10 ATHEROSCLEROSIS OF CORONARY ARTERY OF NATIVE HEART WITHOUT ANGINA PECTORIS, UNSPECIFIED VESSEL OR LESION TYPE: Primary | ICD-10-CM

## 2025-08-13 LAB
ATRIAL RATE: 60
P AXIS: 56
PR INTERVAL: 176
QRS DURATION: 90
QT INTERVAL: 430
QTC CALCULATION(BAZETT): 430
R AXIS: 20
T WAVE AXIS: 48
VENTRICULAR RATE: 60

## 2025-08-13 PROCEDURE — 93000 ELECTROCARDIOGRAM COMPLETE: CPT | Performed by: INTERNAL MEDICINE

## 2025-08-13 PROCEDURE — 99204 OFFICE O/P NEW MOD 45 MIN: CPT | Performed by: INTERNAL MEDICINE

## 2025-08-13 PROCEDURE — 3008F BODY MASS INDEX DOCD: CPT | Performed by: INTERNAL MEDICINE

## 2025-08-13 RX ORDER — METOPROLOL SUCCINATE 25 MG/1
25 TABLET, EXTENDED RELEASE ORAL NIGHTLY
Qty: 90 TABLET | Refills: 3 | Status: SHIPPED | OUTPATIENT
Start: 2025-08-13 | End: 2026-08-13

## 2025-08-13 RX ORDER — UBIDECARENONE 100 MG
300 CAPSULE ORAL DAILY
COMMUNITY

## 2025-08-13 RX ORDER — PSYLLIUM HUSK 0.4 G
1 CAPSULE ORAL DAILY
COMMUNITY

## 2025-08-13 RX ORDER — AMLODIPINE BESYLATE 2.5 MG/1
2.5 TABLET ORAL NIGHTLY
Qty: 90 TABLET | Refills: 3 | Status: SHIPPED | OUTPATIENT
Start: 2025-08-13 | End: 2026-08-13

## 2025-08-25 ENCOUNTER — APPOINTMENT (OUTPATIENT)
Dept: LAB | Facility: HOSPITAL | Age: 89
End: 2025-08-25
Attending: INTERNAL MEDICINE
Payer: COMMERCIAL

## 2025-08-25 DIAGNOSIS — Z00.00 ROUTINE GENERAL MEDICAL EXAMINATION AT A HEALTH CARE FACILITY: ICD-10-CM

## 2025-08-25 LAB
ANION GAP SERPL CALC-SCNC: 7 MEQ/L (ref 3–15)
BUN SERPL-MCNC: 15 MG/DL (ref 7–25)
CALCIUM SERPL-MCNC: 9.4 MG/DL (ref 8.6–10.3)
CHLORIDE SERPL-SCNC: 104 MEQ/L (ref 98–107)
CO2 SERPL-SCNC: 29 MEQ/L (ref 21–31)
CREAT SERPL-MCNC: 0.7 MG/DL (ref 0.7–1.3)
EGFRCR SERPLBLD CKD-EPI 2021: >60 ML/MIN/1.73M*2
GLUCOSE SERPL-MCNC: 82 MG/DL (ref 70–99)
POTASSIUM SERPL-SCNC: 4.1 MEQ/L (ref 3.5–5.1)
SODIUM SERPL-SCNC: 140 MEQ/L (ref 136–145)

## 2025-08-25 PROCEDURE — 80048 BASIC METABOLIC PNL TOTAL CA: CPT

## 2025-08-25 PROCEDURE — 36415 COLL VENOUS BLD VENIPUNCTURE: CPT

## 2025-08-27 ENCOUNTER — TELEPHONE (OUTPATIENT)
Dept: RADIOLOGY | Facility: HOSPITAL | Age: 89
End: 2025-08-27
Payer: COMMERCIAL

## 2025-08-28 ENCOUNTER — HOSPITAL ENCOUNTER (OUTPATIENT)
Dept: RADIOLOGY | Facility: HOSPITAL | Age: 89
Discharge: HOME | End: 2025-08-28
Attending: INTERNAL MEDICINE
Payer: COMMERCIAL

## 2025-08-28 ENCOUNTER — TELEPHONE (OUTPATIENT)
Dept: CARDIOLOGY | Facility: CLINIC | Age: 89
End: 2025-08-28
Payer: COMMERCIAL

## 2025-08-28 VITALS
HEIGHT: 67 IN | HEART RATE: 64 BPM | OXYGEN SATURATION: 94 % | BODY MASS INDEX: 24.96 KG/M2 | WEIGHT: 159 LBS | RESPIRATION RATE: 16 BRPM | DIASTOLIC BLOOD PRESSURE: 74 MMHG | SYSTOLIC BLOOD PRESSURE: 125 MMHG

## 2025-08-28 DIAGNOSIS — I25.110 ATHEROSCLEROTIC HEART DISEASE OF NATIVE CORONARY ARTERY WITH UNSTABLE ANGINA PECTORIS (CMS/HCC): ICD-10-CM

## 2025-08-28 PROCEDURE — B2211ZZ COMPUTERIZED TOMOGRAPHY (CT SCAN) OF MULTIPLE CORONARY ARTERIES USING LOW OSMOLAR CONTRAST: ICD-10-PCS | Performed by: STUDENT IN AN ORGANIZED HEALTH CARE EDUCATION/TRAINING PROGRAM

## 2025-08-28 PROCEDURE — 63700000 HC SELF-ADMINISTRABLE DRUG: Performed by: INTERNAL MEDICINE

## 2025-08-28 PROCEDURE — 75580 N-INVAS EST C FFR SW ALY CTA: CPT

## 2025-08-28 PROCEDURE — 75574 CT ANGIO HRT W/3D IMAGE: CPT

## 2025-08-28 PROCEDURE — 63600105 HC IODINE BASED CONTRAST: Performed by: INTERNAL MEDICINE

## 2025-08-28 PROCEDURE — 63600000 HC DRUGS/DETAIL CODE: Performed by: INTERNAL MEDICINE

## 2025-08-28 RX ORDER — IOPAMIDOL 755 MG/ML
90 INJECTION, SOLUTION INTRAVASCULAR
Status: COMPLETED | OUTPATIENT
Start: 2025-08-28 | End: 2025-08-28

## 2025-08-28 RX ORDER — NITROGLYCERIN 0.4 MG/1
0.4 TABLET SUBLINGUAL ONCE
Status: COMPLETED | OUTPATIENT
Start: 2025-08-28 | End: 2025-08-28

## 2025-08-28 RX ORDER — METOPROLOL TARTRATE 1 MG/ML
5 INJECTION, SOLUTION INTRAVENOUS AS NEEDED
Status: DISCONTINUED | OUTPATIENT
Start: 2025-08-28 | End: 2025-08-29 | Stop reason: HOSPADM

## 2025-08-28 RX ADMIN — NITROGLYCERIN 0.4 MG: 0.4 TABLET SUBLINGUAL at 07:42

## 2025-08-28 RX ADMIN — IOPAMIDOL 90 ML: 755 INJECTION, SOLUTION INTRAVENOUS at 07:52

## 2025-08-28 RX ADMIN — METOPROLOL TARTRATE 2.5 MG: 1 INJECTION, SOLUTION INTRAVENOUS at 07:55

## 2025-09-02 DIAGNOSIS — I25.10 CORONARY ARTERY DISEASE DUE TO CALCIFIED CORONARY LESION: Primary | ICD-10-CM

## 2025-09-02 DIAGNOSIS — I25.84 CORONARY ARTERY DISEASE DUE TO CALCIFIED CORONARY LESION: Primary | ICD-10-CM

## (undated) DEVICE — ***USE 121412***PACK DEVICE IMPLANT TLH

## (undated) DEVICE — KIT CATH LAB ANGIO